# Patient Record
Sex: FEMALE | Race: WHITE | NOT HISPANIC OR LATINO | Employment: OTHER | URBAN - METROPOLITAN AREA
[De-identification: names, ages, dates, MRNs, and addresses within clinical notes are randomized per-mention and may not be internally consistent; named-entity substitution may affect disease eponyms.]

---

## 2017-02-16 RX ORDER — AMLODIPINE BESYLATE 10 MG/1
10 TABLET ORAL DAILY
Qty: 90 TABLET | Refills: 3 | Status: SHIPPED | OUTPATIENT
Start: 2017-02-16 | End: 2018-01-31 | Stop reason: SDUPTHER

## 2017-02-16 RX ORDER — HYDROCHLOROTHIAZIDE 12.5 MG/1
12.5 CAPSULE ORAL DAILY
Qty: 90 CAPSULE | Refills: 3 | Status: SHIPPED | OUTPATIENT
Start: 2017-02-16

## 2017-03-14 ENCOUNTER — DOCUMENTATION ONLY (OUTPATIENT)
Dept: FAMILY MEDICINE | Facility: CLINIC | Age: 82
End: 2017-03-14

## 2017-03-14 NOTE — PROGRESS NOTES
Pre-Visit Chart Review  For Appointment Scheduled on 3-15-17    Health Maintenance Due   Topic Date Due    TETANUS VACCINE  [birth year]18/1950

## 2017-03-15 ENCOUNTER — LAB VISIT (OUTPATIENT)
Dept: LAB | Facility: HOSPITAL | Age: 82
End: 2017-03-15
Attending: FAMILY MEDICINE
Payer: MEDICARE

## 2017-03-15 ENCOUNTER — OFFICE VISIT (OUTPATIENT)
Dept: FAMILY MEDICINE | Facility: CLINIC | Age: 82
End: 2017-03-15
Payer: MEDICARE

## 2017-03-15 VITALS
HEART RATE: 82 BPM | BODY MASS INDEX: 25.08 KG/M2 | WEIGHT: 141.56 LBS | SYSTOLIC BLOOD PRESSURE: 137 MMHG | DIASTOLIC BLOOD PRESSURE: 72 MMHG | TEMPERATURE: 98 F | HEIGHT: 63 IN

## 2017-03-15 DIAGNOSIS — E53.8 B12 NUTRITIONAL DEFICIENCY: ICD-10-CM

## 2017-03-15 DIAGNOSIS — I10 ESSENTIAL HYPERTENSION: Primary | ICD-10-CM

## 2017-03-15 DIAGNOSIS — Z11.1 SCREENING-PULMONARY TB: ICD-10-CM

## 2017-03-15 DIAGNOSIS — F03.90 DEMENTIA WITHOUT BEHAVIORAL DISTURBANCE, UNSPECIFIED DEMENTIA TYPE: ICD-10-CM

## 2017-03-15 DIAGNOSIS — I10 ESSENTIAL HYPERTENSION: ICD-10-CM

## 2017-03-15 LAB
ALBUMIN SERPL BCP-MCNC: 4.4 G/DL
ALP SERPL-CCNC: 70 U/L
ALT SERPL W/O P-5'-P-CCNC: 13 U/L
ANION GAP SERPL CALC-SCNC: 10 MMOL/L
AST SERPL-CCNC: 14 U/L
BASOPHILS # BLD AUTO: 0 K/UL
BASOPHILS NFR BLD: 0.5 %
BILIRUB SERPL-MCNC: 0.4 MG/DL
BUN SERPL-MCNC: 17 MG/DL
CALCIUM SERPL-MCNC: 10 MG/DL
CHLORIDE SERPL-SCNC: 95 MMOL/L
CO2 SERPL-SCNC: 26 MMOL/L
CREAT SERPL-MCNC: 1.1 MG/DL
DIFFERENTIAL METHOD: ABNORMAL
EOSINOPHIL # BLD AUTO: 0.1 K/UL
EOSINOPHIL NFR BLD: 1.5 %
ERYTHROCYTE [DISTWIDTH] IN BLOOD BY AUTOMATED COUNT: 13.6 %
EST. GFR  (AFRICAN AMERICAN): 53 ML/MIN/1.73 M^2
EST. GFR  (NON AFRICAN AMERICAN): 46 ML/MIN/1.73 M^2
GLUCOSE SERPL-MCNC: 119 MG/DL
HCT VFR BLD AUTO: 41.8 %
HGB BLD-MCNC: 14 G/DL
LYMPHOCYTES # BLD AUTO: 1.4 K/UL
LYMPHOCYTES NFR BLD: 17.2 %
MCH RBC QN AUTO: 29.7 PG
MCHC RBC AUTO-ENTMCNC: 33.4 %
MCV RBC AUTO: 89 FL
MONOCYTES # BLD AUTO: 0.7 K/UL
MONOCYTES NFR BLD: 9 %
NEUTROPHILS # BLD AUTO: 5.8 K/UL
NEUTROPHILS NFR BLD: 71.8 %
PLATELET # BLD AUTO: 286 K/UL
PMV BLD AUTO: 7.6 FL
POTASSIUM SERPL-SCNC: 3.7 MMOL/L
PROT SERPL-MCNC: 7.8 G/DL
RBC # BLD AUTO: 4.7 M/UL
SODIUM SERPL-SCNC: 131 MMOL/L
VIT B12 SERPL-MCNC: 1035 PG/ML
WBC # BLD AUTO: 8 K/UL

## 2017-03-15 PROCEDURE — 99999 PR PBB SHADOW E&M-EST. PATIENT-LVL III: CPT | Mod: PBBFAC,,, | Performed by: FAMILY MEDICINE

## 2017-03-15 PROCEDURE — 85025 COMPLETE CBC W/AUTO DIFF WBC: CPT

## 2017-03-15 PROCEDURE — 82607 VITAMIN B-12: CPT

## 2017-03-15 PROCEDURE — 99213 OFFICE O/P EST LOW 20 MIN: CPT | Mod: PBBFAC,PO | Performed by: FAMILY MEDICINE

## 2017-03-15 PROCEDURE — 99214 OFFICE O/P EST MOD 30 MIN: CPT | Mod: S$PBB,,, | Performed by: FAMILY MEDICINE

## 2017-03-15 PROCEDURE — 80053 COMPREHEN METABOLIC PANEL: CPT

## 2017-03-15 NOTE — PROGRESS NOTES
Subjective:       Patient ID: Tiffany Rivers is a 85 y.o. female.    Chief Complaint: Establish Care    Patient Active Problem List   Diagnosis    Hyperlipidemia LDL goal <130    Dementia    Menopause    B12 nutritional deficiency    Subcortical microvascular ischemic occlusive disease    Essential hypertension   Patient is here to establish care.  She is brought in by her granddaughter , her primary caregiver.  She lives in the [facility]. She has dementia but has no behavioral issues.  She is fairly independent requiring assistance with housekeeping, meds mgmt and bathing.  She denies problems today.  She ambulates independently without assistance and has had no falls in last year.  BP records reviewed and all wnl per AL care check    HPI  Review of Systems   Constitutional: Negative for fatigue and unexpected weight change.   Respiratory: Negative for chest tightness and shortness of breath.    Cardiovascular: Negative for chest pain, palpitations and leg swelling.   Gastrointestinal: Negative for abdominal pain.   Musculoskeletal: Negative for arthralgias.   Neurological: Negative for dizziness, syncope, light-headedness and headaches.   Psychiatric/Behavioral: Negative for dysphoric mood and sleep disturbance. The patient is not nervous/anxious.        Objective:      Physical Exam   Constitutional: She is oriented to person, place, and time. She appears well-developed and well-nourished.   Cardiovascular: Normal rate, regular rhythm and normal heart sounds.    Pulmonary/Chest: Effort normal and breath sounds normal.   Musculoskeletal: She exhibits no edema.   Ambulates well without assistance and able to get on and off exam table well   Neurological: She is alert and oriented to person, place, and time.   Skin: Skin is warm and dry.   Psychiatric: She has a normal mood and affect.   Nursing note and vitals reviewed.      Assessment:       1. Essential hypertension    2.  Screening-pulmonary TB    3. B12 nutritional deficiency    4. Dementia without behavioral disturbance, unspecified dementia type        Plan:         1. Essential hypertension  Controlled on current medications.  Continue current medications.    - CBC auto differential; Future  - Comprehensive metabolic panel; Future    2. Screening-pulmonary TB  Screen and treat as indicated:    - QUANTIFERON GOLD TB; Future    3. B12 nutritional deficiency  Screen and treat as indicated:    - Vitamin B12; Future    4. Dementia without behavioral disturbance, unspecified dementia type  Has tried dementia meds with neurology but did not tolerate them. No behavioral disturbance.  Cont support at AL    PCM goal documentation:  Patient readiness: acceptance and barriers:readiness  During the course of the visit the patient was educated and counseled about the following: Hypertension:   Dietary sodium restriction.  Regular aerobic exercise.  Goals: Hypertension: Reduce Blood Pressure  Goal/Outcomes met:Hypertension  The following self management tools provided:none  Patient Instructions (the written plan) was given to the patient/family: Yes  Time spent with patient: 40 minutes    Patient with be reevaluated in 6 months or sooner prn    Greater than 50% of this visit was spent counseling as described in above documentation:Yes

## 2017-03-15 NOTE — MR AVS SNAPSHOT
[facility]  [address]  [place] [address]  Phone: [phone]  Fax: [phone]                  Tiffany Rivers   3/15/2017 1:20 PM   Office Visit    Description:  Female : 1932   Provider:  Imelda Rodriguez MD   Department:  [facility]           Reason for Visit     Establish Care           Diagnoses this Visit        Comments    Screening-pulmonary TB    -  Primary     Essential hypertension         B12 nutritional deficiency                To Do List           Future Appointments        Provider Department Dept Phone    3/15/2017 2:10 PM [facility] [phone]    3/15/2017 2:20 PM [facility] [phone]    9/15/2017 1:20 PM Imelda Rodriguez MD [facility] [phone]      Goals (5 Years of Data)     None      Follow-Up and Disposition     Return in about 6 months (around 9/15/2017).      [facility]     [facility] - 24/7 Assistance  Registered nurses in the [facility] provide clinical advisement, health education, appointment booking, and other advisory services.  Call for this free service at [phone].             Medications                Verify that the below list of medications is an accurate representation of the medications you are currently taking.  If none reported, the list may be blank. If incorrect, please contact your healthcare provider. Carry this list with you in case of emergency.           Current Medications     amlodipine (NORVASC) 10 MG tablet Take 1 tablet (10 mg total) by mouth once daily.    aspirin 81 MG Chew Take 81 mg by mouth once daily.    cloNIDine (CATAPRES) 0.1 MG tablet Take one tablet q 6 hr prn blood pressure systolic over 180 or diastolic over 100    cyanocobalamin (VITAMIN B-12) 100 MCG tablet Take 100 mcg by mouth once daily.    hydrochlorothiazide (MICROZIDE) 12.5 mg capsule Take 1  "capsule (12.5 mg total) by mouth once daily.    lisinopril (PRINIVIL,ZESTRIL) 40 MG tablet Take 1 tablet (40 mg total) by mouth 2 (two) times daily.           Clinical Reference Information           Your Vitals Were     BP Pulse Temp Height Weight BMI    137/72 (BP Location: Left arm, Patient Position: Sitting, BP Method: Automatic) 82 97.6 °F (36.4 °C) (Oral) 5' 3" (1.6 m) 64.2 kg (141 lb 8.6 oz) 25.07 kg/m2      Blood Pressure          Most Recent Value    BP  137/72      Allergies as of 3/15/2017     No Known Allergies      Immunizations Administered on Date of Encounter - 3/15/2017     None      Orders Placed During Today's Visit     Future Labs/Procedures Expected by Expires    CBC auto differential  3/15/2017 5/14/2018    Comprehensive metabolic panel  3/15/2017 5/14/2018    QUANTIFERON GOLD TB  3/15/2017 5/14/2018    Vitamin B12  3/15/2017 5/14/2018      [facility] Sign-Up     Activating your [facility] account is as easy as 1-2-3!     1) Visit [email or web address], select Sign Up Now, enter this activation code and your date of birth, then select Next.  [email or web address]  Expires: 4/29/2017  1:55 PM      2) Create a username and password to use when you visit [facility] in the future and select a security question in case you lose your password and select Next.    3) Enter your e-mail address and click Sign Up!    Additional Information  If you have questions, please e-mail [email or web address] or call [phone] to talk to our [facility] staff. Remember, [facility] is NOT to be used for urgent needs. For medical emergencies, dial 911.         Language Assistance Services     ATTENTION: Language assistance services are available, free of charge. Please call [phone].      ATENCIÓN: Si habla español, tiene a jason disposición servicios gratuitos de asistencia lingüística. Llame al [phone].     CHÚ Ý: N?u b?n nói Ti?ng Vi?t, có các d?ch v? h? tr? ngôn ng? mi?n phí gilberth cho b?n. G?i s? [phone].      "    [facility] complies with applicable Federal civil rights laws and does not discriminate on the basis of race, color, national origin, age, disability, or sex.

## 2017-03-19 ENCOUNTER — TELEPHONE (OUTPATIENT)
Dept: FAMILY MEDICINE | Facility: CLINIC | Age: 82
End: 2017-03-19

## 2017-05-11 DIAGNOSIS — I10 ESSENTIAL HYPERTENSION: ICD-10-CM

## 2017-05-12 RX ORDER — LISINOPRIL 40 MG/1
40 TABLET ORAL 2 TIMES DAILY
Qty: 60 TABLET | Refills: 11 | Status: SHIPPED | OUTPATIENT
Start: 2017-05-12 | End: 2017-07-27 | Stop reason: SDUPTHER

## 2017-06-08 ENCOUNTER — TELEPHONE (OUTPATIENT)
Dept: FAMILY MEDICINE | Facility: CLINIC | Age: 82
End: 2017-06-08

## 2017-06-08 NOTE — TELEPHONE ENCOUNTER
----- Message from Kae Hastings sent at 6/8/2017 10:52 AM CDT -----  Please call [facility] in reference to patient.  Call Snadra at [phone]

## 2017-06-08 NOTE — TELEPHONE ENCOUNTER
Sandra briggs, states that patient is moving to [facility]. Had a physical in March. Center paid for physical and TB test at [facility]. Wants to see if you will fill out sections that physician could not (since he does not know her). Will fax over paperwork.

## 2017-06-09 ENCOUNTER — TELEPHONE (OUTPATIENT)
Dept: FAMILY MEDICINE | Facility: CLINIC | Age: 82
End: 2017-06-09

## 2017-06-09 NOTE — TELEPHONE ENCOUNTER
I can complete the paperwork without an additional appointment.  Drop it off to attn: Dr. Rodriguez staff

## 2017-06-09 NOTE — TELEPHONE ENCOUNTER
----- Message from Yuliet Lopez LPN sent at 6/5/2017  9:56 AM CDT -----  Contact: Daughter Carmen Pelaez [phone]      ----- Message -----  From: María Parker  Sent: 6/5/2017   9:16 AM  To: Jennifer MORELOS Staff    She has to move her mom to the memory care area of the assisted living facility, they are telling Carmen that she needs another physical and TB test (within 30 days of admittance to the unit).  She was seen in March for these.  Should she be seen for these or will you complete the paperwork.  Please call her.  Thank you!

## 2017-07-27 DIAGNOSIS — I10 ESSENTIAL HYPERTENSION: ICD-10-CM

## 2017-07-27 RX ORDER — LISINOPRIL 40 MG/1
40 TABLET ORAL 2 TIMES DAILY
Qty: 180 TABLET | Refills: 3 | Status: SHIPPED | OUTPATIENT
Start: 2017-07-27

## 2017-08-03 ENCOUNTER — TELEPHONE (OUTPATIENT)
Dept: FAMILY MEDICINE | Facility: CLINIC | Age: 82
End: 2017-08-03

## 2017-08-03 NOTE — TELEPHONE ENCOUNTER
Received fax from the nurse at [facility] reporting that Ms Rivers is resistant to assistance with ADL's and is combative. Requesting PRN order for anxiety for the patient. Please advise.

## 2017-09-11 DIAGNOSIS — Z78.0 ASYMPTOMATIC MENOPAUSAL STATE: Primary | ICD-10-CM

## 2017-09-12 ENCOUNTER — DOCUMENTATION ONLY (OUTPATIENT)
Dept: FAMILY MEDICINE | Facility: CLINIC | Age: 82
End: 2017-09-12

## 2017-09-12 NOTE — PROGRESS NOTES
Pre-Visit Chart Review  For Appointment Scheduled on 09/14/2017    Health Maintenance Due   Topic Date Due    TETANUS VACCINE  [birth year]18/1950    DEXA SCAN  02/18/1972    Zoster Vaccine  02/18/1992    Pneumococcal (65+) (2 of 2 - PCV13) 10/23/2014    Influenza Vaccine  08/01/2017

## 2017-09-14 ENCOUNTER — LAB VISIT (OUTPATIENT)
Dept: LAB | Facility: HOSPITAL | Age: 82
End: 2017-09-14
Attending: PHYSICIAN ASSISTANT
Payer: MEDICARE

## 2017-09-14 ENCOUNTER — OFFICE VISIT (OUTPATIENT)
Dept: FAMILY MEDICINE | Facility: CLINIC | Age: 82
End: 2017-09-14
Payer: MEDICARE

## 2017-09-14 VITALS
WEIGHT: 136.25 LBS | BODY MASS INDEX: 24.14 KG/M2 | DIASTOLIC BLOOD PRESSURE: 70 MMHG | HEART RATE: 82 BPM | TEMPERATURE: 98 F | HEIGHT: 63 IN | SYSTOLIC BLOOD PRESSURE: 140 MMHG

## 2017-09-14 DIAGNOSIS — F03.91 DEMENTIA WITH BEHAVIORAL DISTURBANCE, UNSPECIFIED DEMENTIA TYPE: Primary | ICD-10-CM

## 2017-09-14 DIAGNOSIS — I10 ESSENTIAL HYPERTENSION: ICD-10-CM

## 2017-09-14 LAB
ALBUMIN SERPL BCP-MCNC: 4.1 G/DL
ALP SERPL-CCNC: 88 U/L
ALT SERPL W/O P-5'-P-CCNC: 11 U/L
ANION GAP SERPL CALC-SCNC: 10 MMOL/L
AST SERPL-CCNC: 14 U/L
BILIRUB SERPL-MCNC: 0.3 MG/DL
BUN SERPL-MCNC: 14 MG/DL
CALCIUM SERPL-MCNC: 9.7 MG/DL
CHLORIDE SERPL-SCNC: 97 MMOL/L
CO2 SERPL-SCNC: 28 MMOL/L
CREAT SERPL-MCNC: 0.9 MG/DL
EST. GFR  (AFRICAN AMERICAN): >60 ML/MIN/1.73 M^2
EST. GFR  (NON AFRICAN AMERICAN): 58.5 ML/MIN/1.73 M^2
GLUCOSE SERPL-MCNC: 104 MG/DL
POTASSIUM SERPL-SCNC: 3.7 MMOL/L
PROT SERPL-MCNC: 7.8 G/DL
SODIUM SERPL-SCNC: 135 MMOL/L

## 2017-09-14 PROCEDURE — 3077F SYST BP >= 140 MM HG: CPT | Mod: ,,, | Performed by: PHYSICIAN ASSISTANT

## 2017-09-14 PROCEDURE — 36415 COLL VENOUS BLD VENIPUNCTURE: CPT | Mod: PO

## 2017-09-14 PROCEDURE — 99214 OFFICE O/P EST MOD 30 MIN: CPT | Mod: S$PBB,,, | Performed by: PHYSICIAN ASSISTANT

## 2017-09-14 PROCEDURE — 80053 COMPREHEN METABOLIC PANEL: CPT

## 2017-09-14 PROCEDURE — 3078F DIAST BP <80 MM HG: CPT | Mod: ,,, | Performed by: PHYSICIAN ASSISTANT

## 2017-09-14 PROCEDURE — 99214 OFFICE O/P EST MOD 30 MIN: CPT | Mod: PBBFAC,PO | Performed by: PHYSICIAN ASSISTANT

## 2017-09-14 PROCEDURE — 99999 PR PBB SHADOW E&M-EST. PATIENT-LVL IV: CPT | Mod: PBBFAC,,, | Performed by: PHYSICIAN ASSISTANT

## 2017-09-14 PROCEDURE — 1126F AMNT PAIN NOTED NONE PRSNT: CPT | Mod: ,,, | Performed by: PHYSICIAN ASSISTANT

## 2017-09-14 PROCEDURE — 1159F MED LIST DOCD IN RCRD: CPT | Mod: ,,, | Performed by: PHYSICIAN ASSISTANT

## 2017-09-14 NOTE — TELEPHONE ENCOUNTER
Dr. Rodriguez,    The patient is bathing 3 nights per week with assistance. No symptoms of depression/anxiety. Patient's family understands pros/cons of ativan. I have pended the RX.  Ativan 1 mg PO 1 hour prior to bath nightly. No more than 1 tablet per day. Dispense # 15    Flavia WASHINGTON
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
## 2017-09-14 NOTE — PATIENT INSTRUCTIONS
Controlling High Blood Pressure  High blood pressure (hypertension) is often called the silent killer. This is because many people who have it dont know it. High blood pressure is defined as 140/90 mm Hg or higher. Know your blood pressure and remember to check it regularly. Doing so can save your life. Here are some things you can do to help control your blood pressure.    Choose heart-healthy foods  · Select low-salt, low-fat foods. Limit sodium intake to 2,400 mg per day or the amount suggested by your healthcare provider.  · Limit canned, dried, cured, packaged, and fast foods. These can contain a lot of salt.  · Eat 8 to 10 servings of fruits and vegetables every day.  · Choose lean meats, fish, or chicken.  · Eat whole-grain pasta, brown rice, and beans.  · Eat 2 to 3 servings of low-fat or fat-free dairy products.  · Ask your doctor about the DASH eating plan. This plan helps reduce blood pressure.  · When you go to a restaurant, ask that your meal be prepared with no added salt.  Maintain a healthy weight  · Ask your healthcare provider how many calories to eat a day. Then stick to that number.  · Ask your healthcare provider what weight range is healthiest for you. If you are overweight, a weight loss of only 3% to 5% of your body weight can help lower blood pressure. Generally, a good weight loss goal is to lose 10% of your body weight in a year.  · Limit snacks and sweets.  · Get regular exercise.  Get up and get active  · Choose activities you enjoy. Find ones you can do with friends or family. This includes bicycling, dancing, walking, and jogging.  · Park farther away from building entrances.  · Use stairs instead of the elevator.  · When you can, walk or bike instead of driving.  · Irving leaves, garden, or do household repairs.  · Be active at a moderate to vigorous level of physical activity for at least 40 minutes for a minimum of 3 to 4 days a week.   Manage stress  · Make time to relax and enjoy  life. Find time to laugh.  · Communicate your concerns with your loved ones and your healthcare provider.  · Visit with family and friends, and keep up with hobbies.  Limit alcohol and quit smoking  · Men should have no more than 2 drinks per day.  · Women should have no more than 1 drink per day.  · Talk with your healthcare provider about quitting smoking. Smoking significantly increases your risk for heart disease and stroke. Ask your healthcare provider about community smoking cessation programs and other options.  Medicines  If lifestyle changes arent enough, your healthcare provider may prescribe high blood pressure medicine. Take all medicines as prescribed. If you have any questions about your medicines, ask your healthcare provider before stopping or changing them.   Date Last Reviewed: 4/27/2016  © 0578-2950 [employer]. [address] [place] [address], [place] [address]. All rights reserved. This information is not intended as a substitute for professional medical care. Always follow your healthcare professional's instructions.

## 2017-09-18 RX ORDER — LORAZEPAM 1 MG/1
TABLET ORAL
Qty: 15 TABLET | Refills: 0 | Status: SHIPPED | OUTPATIENT
Start: 2017-09-18 | End: 2017-09-21 | Stop reason: SDUPTHER

## 2017-09-21 DIAGNOSIS — F03.91 DEMENTIA WITH BEHAVIORAL DISTURBANCE, UNSPECIFIED DEMENTIA TYPE: ICD-10-CM

## 2017-09-21 DIAGNOSIS — R45.1 AGITATION: Primary | ICD-10-CM

## 2017-09-21 RX ORDER — LORAZEPAM 1 MG/1
1 TABLET ORAL DAILY PRN
Qty: 15 TABLET | Refills: 0 | Status: SHIPPED | OUTPATIENT
Start: 2017-09-21 | End: 2017-10-04 | Stop reason: SDUPTHER

## 2017-09-21 NOTE — PROGRESS NOTES
In response to comments from [facility] (notify them): Obsessions, confusions, wandering, agitation are all typical symptoms of dementia.  She is in the memory care section of [facility].  Staff should be trained on redirection and deescalation, reorienting strategies.  I did not get an impression from family member that day to day behavior was severe or dangerous except when resisting bathing.  Patient should not be forced to bathe daily.  Every other days should suffice unless visibly soiled.  If behavior is prohibitive to placement or she is a danger to herself or others then no further medical interventions are needed at this time other than a lorazepam 1 hour prior to a bath as needed.

## 2017-09-25 ENCOUNTER — TELEPHONE (OUTPATIENT)
Dept: FAMILY MEDICINE | Facility: CLINIC | Age: 82
End: 2017-09-25

## 2017-09-25 NOTE — TELEPHONE ENCOUNTER
Please make appt to assess the patient before x-ray.  May use urgent care or [facility] if needed-try to get seen within 24 hours

## 2017-09-25 NOTE — TELEPHONE ENCOUNTER
----- Message from Laura Reece sent at 9/25/2017  1:20 PM CDT -----  Contact: Sam with [facility]   Sam with [facility] called to report that the patient has a fall and is complaining of coccyx pain  She would like an order for a portal xray   Please call her at  to advise.    Thanks

## 2017-09-27 ENCOUNTER — HOSPITAL ENCOUNTER (INPATIENT)
Facility: HOSPITAL | Age: 82
LOS: 1 days | Discharge: NURSING, REHAB OR LONG-TERM CARE | DRG: 313 | End: 2017-09-29
Attending: EMERGENCY MEDICINE | Admitting: HOSPITALIST
Payer: MEDICARE

## 2017-09-27 DIAGNOSIS — R07.9 CHEST PAIN, UNSPECIFIED TYPE: Primary | ICD-10-CM

## 2017-09-27 DIAGNOSIS — R07.9 CHEST PAIN: ICD-10-CM

## 2017-09-27 LAB
ANION GAP SERPL CALC-SCNC: 11 MMOL/L
BASOPHILS # BLD AUTO: 0 K/UL
BASOPHILS NFR BLD: 0.1 %
BUN SERPL-MCNC: 15 MG/DL
CALCIUM SERPL-MCNC: 10.3 MG/DL
CHLORIDE SERPL-SCNC: 94 MMOL/L
CO2 SERPL-SCNC: 26 MMOL/L
CREAT SERPL-MCNC: 0.8 MG/DL
D DIMER PPP IA.FEU-MCNC: 2.78 MG/L FEU
DIFFERENTIAL METHOD: ABNORMAL
EOSINOPHIL # BLD AUTO: 0.1 K/UL
EOSINOPHIL NFR BLD: 1.4 %
ERYTHROCYTE [DISTWIDTH] IN BLOOD BY AUTOMATED COUNT: 12.9 %
EST. GFR  (AFRICAN AMERICAN): >60 ML/MIN/1.73 M^2
EST. GFR  (NON AFRICAN AMERICAN): >60 ML/MIN/1.73 M^2
GLUCOSE SERPL-MCNC: 117 MG/DL
HCT VFR BLD AUTO: 43.2 %
HGB BLD-MCNC: 14.4 G/DL
LYMPHOCYTES # BLD AUTO: 1.2 K/UL
LYMPHOCYTES NFR BLD: 12 %
MCH RBC QN AUTO: 30 PG
MCHC RBC AUTO-ENTMCNC: 33.3 G/DL
MCV RBC AUTO: 90 FL
MONOCYTES # BLD AUTO: 0.7 K/UL
MONOCYTES NFR BLD: 6.9 %
NEUTROPHILS # BLD AUTO: 8.1 K/UL
NEUTROPHILS NFR BLD: 79.6 %
PLATELET # BLD AUTO: 302 K/UL
PMV BLD AUTO: 7.4 FL
POTASSIUM SERPL-SCNC: 3.5 MMOL/L
RBC # BLD AUTO: 4.8 M/UL
SODIUM SERPL-SCNC: 131 MMOL/L
TROPONIN I SERPL DL<=0.01 NG/ML-MCNC: <0.006 NG/ML
WBC # BLD AUTO: 10.2 K/UL

## 2017-09-27 PROCEDURE — 36415 COLL VENOUS BLD VENIPUNCTURE: CPT

## 2017-09-27 PROCEDURE — 99285 EMERGENCY DEPT VISIT HI MDM: CPT | Mod: 25

## 2017-09-27 PROCEDURE — 25000003 PHARM REV CODE 250: Performed by: EMERGENCY MEDICINE

## 2017-09-27 PROCEDURE — 96360 HYDRATION IV INFUSION INIT: CPT

## 2017-09-27 PROCEDURE — 80048 BASIC METABOLIC PNL TOTAL CA: CPT

## 2017-09-27 PROCEDURE — 93005 ELECTROCARDIOGRAM TRACING: CPT

## 2017-09-27 PROCEDURE — G0378 HOSPITAL OBSERVATION PER HR: HCPCS

## 2017-09-27 PROCEDURE — 84484 ASSAY OF TROPONIN QUANT: CPT

## 2017-09-27 PROCEDURE — 96361 HYDRATE IV INFUSION ADD-ON: CPT

## 2017-09-27 PROCEDURE — 85379 FIBRIN DEGRADATION QUANT: CPT

## 2017-09-27 PROCEDURE — 25500020 PHARM REV CODE 255

## 2017-09-27 PROCEDURE — 85025 COMPLETE CBC W/AUTO DIFF WBC: CPT

## 2017-09-27 RX ADMIN — IOHEXOL 64 ML: 350 INJECTION, SOLUTION INTRAVENOUS at 08:09

## 2017-09-27 RX ADMIN — SODIUM CHLORIDE 500 ML: 900 INJECTION, SOLUTION INTRAVENOUS at 07:09

## 2017-09-27 NOTE — ED PROVIDER NOTES
Encounter Date: 9/27/2017    SCRIBE #1 NOTE: I, Haleigh Torres , am scribing for, and in the presence of,  Dr. Roe . I have scribed the entire note.       History     Chief Complaint   Patient presents with    Chest Pain       09/27/2017  6:43 PM     Chief Complaint: CP      The patient is a 85 y.o. female who is presenting per EMS from [facility] for the acute onset of CP that began just PTA and has since resolved. Per EMS, the pt c/o of mid sternal CP that was able to resolved with an anti-acid. No other comments or complaints. The pt currently denies being in any distress. No recent SOB, diaphoresis, or emesis. Pertinent PMHx includes HTN and dementia. No pertinent past surgical hx.                 The history is provided by the patient and medical records.     Review of patient's allergies indicates:  No Known Allergies  Past Medical History:   Diagnosis Date    Hypertension      Past Surgical History:   Procedure Laterality Date    CATARACT EXTRACTION W/  INTRAOCULAR LENS IMPLANT      COLONOSCOPY  1999    normal per patient.      HYSTERECTOMY       Family History   Problem Relation Age of Onset    Diabetes Mother     Heart disease Father     Cancer Neg Hx      Social History   Substance Use Topics    Smoking status: Never Smoker    Smokeless tobacco: Never Used    Alcohol use No     Review of Systems   Constitutional: Negative for fever.   HENT: Negative for sore throat.    Eyes: Negative for visual disturbance.   Respiratory: Negative for shortness of breath.    Cardiovascular: Positive for chest pain (since resolved ).   Gastrointestinal: Negative for nausea.   Genitourinary: Negative for dysuria.   Musculoskeletal: Negative for back pain.   Skin: Negative for rash.   Neurological: Negative for weakness.   Hematological: Does not bruise/bleed easily.   Psychiatric/Behavioral: Negative for confusion.       Physical Exam     Initial Vitals   BP Pulse Resp Temp SpO2   -- --  -- -- --      MAP       --         Physical Exam    Nursing note and vitals reviewed.  Constitutional: She appears well-developed and well-nourished.   HENT:   Head: Normocephalic and atraumatic.   Mouth/Throat: Oropharynx is clear and moist. No oropharyngeal exudate.   Eyes: EOM are normal. Pupils are equal, round, and reactive to light.   Neck: Normal range of motion. Neck supple.   Cardiovascular: Regular rhythm, normal heart sounds and intact distal pulses. Exam reveals no gallop and no friction rub.    No murmur heard.  Tachycardic rate   Pulmonary/Chest: Breath sounds normal. She has no wheezes. She has no rhonchi. She has no rales. She exhibits no tenderness.   Abdominal: Soft. She exhibits no distension. There is no tenderness.   Lymphadenopathy:     She has no cervical adenopathy.   Neurological: She is alert. She has normal strength. A sensory deficit is present.   Baseline mental status    Skin: Skin is warm and dry. Capillary refill takes less than 2 seconds.         ED Course   Procedures  Labs Reviewed   CBC W/ AUTO DIFFERENTIAL - Abnormal; Notable for the following:        Result Value    MPV 7.4 (*)     Gran # 8.1 (*)     Gran% 79.6 (*)     Lymph% 12.0 (*)     All other components within normal limits   BASIC METABOLIC PANEL - Abnormal; Notable for the following:     Sodium 131 (*)     Chloride 94 (*)     Glucose 117 (*)     All other components within normal limits   D DIMER, QUANTITATIVE - Abnormal; Notable for the following:     D-Dimer 2.78 (*)     All other components within normal limits   TROPONIN I                        Scribe Attestation:   Scribe #1: I performed the above scribed service and the documentation accurately describes the services I performed. I attest to the accuracy of the note.    Attending Attestation:           Physician Attestation for Scribe:  Physician Attestation Statement for Scribe #1: I, Dr. Roe , reviewed documentation, as scribed by Haleigh Torres  in my  presence, and it is both accurate and complete.         Tiffany Rivers is a 85 y.o. female presenting with episode of chest pain.  Factors uncertain as patient is a very poor historian with poor recall.  She appears comfortable here.  Initial workup is negative.  Positive d-dimer with negative CT angiogram for PE.  Low suspicion for dissection.  I did discuss this extensively with family wished the patient admitted for further monitoring.  I feel this is reasonable given advanced age and limited available history.  She is comfortable at present.  I will admit to the hospital service for serial troponin and evaluation and further cardiac monitoring.        ED Course as of Sep 27 2227   Wed Sep 27, 2017   1910 EKG:  Sinus tachycardia, rate of 101, normal intervals and axis.  There are no acute ST or T wave changes suggestive of acute ischemia or infarction.  [MR]   1939 CXR:  Atelectasis L lung base, NAD. (my read)  [MR]   2103 CT-Chest:  1. There is bibasilar atelectatic change or scarring. Cannot exclude a focus of dense pneumonitis in  the left lower lobe base.  2. There is a moderate hiatal hernia.  3. No visible acute pulmonary embolism.  (rad read)  [MR]      ED Course User Index  [MR] Bonifacio Roe MD     Clinical Impression:   The encounter diagnosis was Chest pain, unspecified type.                           Bonifacio Roe MD  09/27/17 2228

## 2017-09-28 PROBLEM — K59.00 CONSTIPATION: Status: ACTIVE | Noted: 2017-09-28

## 2017-09-28 LAB
CHOLEST SERPL-MCNC: 193 MG/DL
CHOLEST/HDLC SERPL: 3.4 {RATIO}
HDLC SERPL-MCNC: 56 MG/DL
HDLC SERPL: 29 %
LDLC SERPL CALC-MCNC: 127.2 MG/DL
NONHDLC SERPL-MCNC: 137 MG/DL
TRIGL SERPL-MCNC: 49 MG/DL
TROPONIN I SERPL DL<=0.01 NG/ML-MCNC: 0.01 NG/ML
TROPONIN I SERPL DL<=0.01 NG/ML-MCNC: 0.03 NG/ML

## 2017-09-28 PROCEDURE — 93005 ELECTROCARDIOGRAM TRACING: CPT

## 2017-09-28 PROCEDURE — 25000003 PHARM REV CODE 250: Performed by: NURSE PRACTITIONER

## 2017-09-28 PROCEDURE — 63600175 PHARM REV CODE 636 W HCPCS: Performed by: HOSPITALIST

## 2017-09-28 PROCEDURE — 84484 ASSAY OF TROPONIN QUANT: CPT

## 2017-09-28 PROCEDURE — 63600175 PHARM REV CODE 636 W HCPCS

## 2017-09-28 PROCEDURE — 80061 LIPID PANEL: CPT

## 2017-09-28 PROCEDURE — 96372 THER/PROPH/DIAG INJ SC/IM: CPT

## 2017-09-28 PROCEDURE — 36415 COLL VENOUS BLD VENIPUNCTURE: CPT

## 2017-09-28 PROCEDURE — 96366 THER/PROPH/DIAG IV INF ADDON: CPT

## 2017-09-28 PROCEDURE — 11000001 HC ACUTE MED/SURG PRIVATE ROOM

## 2017-09-28 RX ORDER — ZIPRASIDONE MESYLATE 20 MG/ML
5 INJECTION, POWDER, LYOPHILIZED, FOR SOLUTION INTRAMUSCULAR ONCE
Status: COMPLETED | OUTPATIENT
Start: 2017-09-28 | End: 2017-09-28

## 2017-09-28 RX ORDER — LISINOPRIL 40 MG/1
40 TABLET ORAL 2 TIMES DAILY
Status: DISCONTINUED | OUTPATIENT
Start: 2017-09-28 | End: 2017-09-29 | Stop reason: HOSPADM

## 2017-09-28 RX ORDER — NAPROXEN SODIUM 220 MG/1
81 TABLET, FILM COATED ORAL DAILY
Status: DISCONTINUED | OUTPATIENT
Start: 2017-09-28 | End: 2017-09-29 | Stop reason: HOSPADM

## 2017-09-28 RX ORDER — SODIUM CHLORIDE 9 MG/ML
INJECTION, SOLUTION INTRAVENOUS CONTINUOUS
Status: DISCONTINUED | OUTPATIENT
Start: 2017-09-28 | End: 2017-09-28

## 2017-09-28 RX ORDER — ACETAMINOPHEN 325 MG/1
650 TABLET ORAL EVERY 6 HOURS PRN
Status: DISCONTINUED | OUTPATIENT
Start: 2017-09-28 | End: 2017-09-29 | Stop reason: HOSPADM

## 2017-09-28 RX ORDER — BISACODYL 10 MG
10 SUPPOSITORY, RECTAL RECTAL ONCE
Status: COMPLETED | OUTPATIENT
Start: 2017-09-28 | End: 2017-09-28

## 2017-09-28 RX ORDER — ATROPINE SULFATE 0.1 MG/ML
INJECTION INTRAVENOUS
Status: DISCONTINUED
Start: 2017-09-28 | End: 2017-09-28 | Stop reason: WASHOUT

## 2017-09-28 RX ORDER — CLONIDINE HYDROCHLORIDE 0.1 MG/1
0.1 TABLET ORAL EVERY 6 HOURS PRN
Status: DISCONTINUED | OUTPATIENT
Start: 2017-09-28 | End: 2017-09-29 | Stop reason: HOSPADM

## 2017-09-28 RX ORDER — POLYETHYLENE GLYCOL 3350 17 G/17G
17 POWDER, FOR SOLUTION ORAL DAILY
Status: DISCONTINUED | OUTPATIENT
Start: 2017-09-28 | End: 2017-09-29 | Stop reason: HOSPADM

## 2017-09-28 RX ORDER — DOBUTAMINE HYDROCHLORIDE 200 MG/100ML
50 INJECTION INTRAVENOUS ONCE
Status: DISCONTINUED | OUTPATIENT
Start: 2017-09-28 | End: 2017-09-28

## 2017-09-28 RX ORDER — PANTOPRAZOLE SODIUM 40 MG/1
40 TABLET, DELAYED RELEASE ORAL DAILY
Status: DISCONTINUED | OUTPATIENT
Start: 2017-09-28 | End: 2017-09-29 | Stop reason: HOSPADM

## 2017-09-28 RX ORDER — ONDANSETRON 2 MG/ML
4 INJECTION INTRAMUSCULAR; INTRAVENOUS EVERY 6 HOURS PRN
Status: DISCONTINUED | OUTPATIENT
Start: 2017-09-28 | End: 2017-09-29 | Stop reason: HOSPADM

## 2017-09-28 RX ORDER — MAG HYDROX/ALUMINUM HYD/SIMETH 200-200-20
30 SUSPENSION, ORAL (FINAL DOSE FORM) ORAL EVERY 6 HOURS PRN
Status: DISCONTINUED | OUTPATIENT
Start: 2017-09-28 | End: 2017-09-29 | Stop reason: HOSPADM

## 2017-09-28 RX ORDER — OLANZAPINE 10 MG/2ML
2.5 INJECTION, POWDER, FOR SOLUTION INTRAMUSCULAR ONCE AS NEEDED
Status: DISPENSED | OUTPATIENT
Start: 2017-09-28 | End: 2017-09-28

## 2017-09-28 RX ORDER — LORAZEPAM 1 MG/1
1 TABLET ORAL DAILY PRN
Status: DISCONTINUED | OUTPATIENT
Start: 2017-09-28 | End: 2017-09-29 | Stop reason: HOSPADM

## 2017-09-28 RX ORDER — PNV NO.95/FERROUS FUM/FOLIC AC 28MG-0.8MG
100 TABLET ORAL DAILY
Status: DISCONTINUED | OUTPATIENT
Start: 2017-09-28 | End: 2017-09-29 | Stop reason: HOSPADM

## 2017-09-28 RX ORDER — HYDROCHLOROTHIAZIDE 12.5 MG/1
12.5 TABLET ORAL DAILY
Status: DISCONTINUED | OUTPATIENT
Start: 2017-09-28 | End: 2017-09-29 | Stop reason: HOSPADM

## 2017-09-28 RX ORDER — NITROGLYCERIN 0.4 MG/1
0.4 TABLET SUBLINGUAL EVERY 5 MIN PRN
Status: DISCONTINUED | OUTPATIENT
Start: 2017-09-28 | End: 2017-09-29 | Stop reason: HOSPADM

## 2017-09-28 RX ORDER — AMLODIPINE BESYLATE 5 MG/1
10 TABLET ORAL DAILY
Status: DISCONTINUED | OUTPATIENT
Start: 2017-09-28 | End: 2017-09-29 | Stop reason: HOSPADM

## 2017-09-28 RX ADMIN — VITAM B12 100 MCG: 100 TAB at 08:09

## 2017-09-28 RX ADMIN — ALUMINUM HYDROXIDE, MAGNESIUM HYDROXIDE, AND SIMETHICONE 30 ML: 200; 200; 20 SUSPENSION ORAL at 06:09

## 2017-09-28 RX ADMIN — ASPIRIN 81 MG CHEWABLE TABLET 81 MG: 81 TABLET CHEWABLE at 08:09

## 2017-09-28 RX ADMIN — BISACODYL 10 MG: 10 SUPPOSITORY RECTAL at 05:09

## 2017-09-28 RX ADMIN — LISINOPRIL 40 MG: 40 TABLET ORAL at 08:09

## 2017-09-28 RX ADMIN — LORAZEPAM 1 MG: 1 TABLET ORAL at 08:09

## 2017-09-28 RX ADMIN — AMLODIPINE BESYLATE 10 MG: 5 TABLET ORAL at 08:09

## 2017-09-28 RX ADMIN — LISINOPRIL 40 MG: 40 TABLET ORAL at 01:09

## 2017-09-28 RX ADMIN — HYDROCHLOROTHIAZIDE 12.5 MG: 12.5 TABLET ORAL at 08:09

## 2017-09-28 RX ADMIN — POLYETHYLENE GLYCOL (3350) 17 G: 17 POWDER, FOR SOLUTION ORAL at 08:09

## 2017-09-28 RX ADMIN — ACETAMINOPHEN 650 MG: 325 TABLET ORAL at 08:09

## 2017-09-28 RX ADMIN — ZIPRASIDONE MESYLATE 5 MG: 20 INJECTION, POWDER, LYOPHILIZED, FOR SOLUTION INTRAMUSCULAR at 11:09

## 2017-09-28 RX ADMIN — SODIUM CHLORIDE: 0.9 INJECTION, SOLUTION INTRAVENOUS at 01:09

## 2017-09-28 RX ADMIN — PANTOPRAZOLE SODIUM 40 MG: 40 TABLET, DELAYED RELEASE ORAL at 11:09

## 2017-09-28 RX ADMIN — ACETAMINOPHEN 650 MG: 325 TABLET ORAL at 06:09

## 2017-09-28 NOTE — ED NOTES
Pt resting comfortably, no needs voiced at this time, denies chest pain. NAD, will continue to monitor.

## 2017-09-28 NOTE — HOSPITAL COURSE
Tiffany Rivers is an 85 year old female with a PMH of HTN and Dementia from [facility]  admitted to [facility] on 9/27/2017 for chest pain. Patient had unremarkable EKG's. Cardiac enzymes negative with the exception of second troponin slightly elevated; CE's repeated x 2 were normal.  Although patient poor historian due to dementia her chest pain is atypical; relieved by antacid and MARINA score 2;  Patient did not tolerate initial attempt at stress test due to  agitation which is now resolved; Dobutamine stress echo done today was unremarkable; patient tolerated well.  Patient's vital signs remain stable.  D- Dimer elevated on admission; CTA negative. Patient had tachycardia and HTN intermittently throughout hospitalization; given IVF's and BB initiated.  Labs unremarkable today - see labs; PPI initiated. KUB revealed constipation; mirilax and dulcolax prescribed; patient's abdomen soft without tenderness or distention on exam; she has no complaints of pain; she states she suffers from chronic constipation.  Patient will be discharged to [facility] and follow up with PCP next week.

## 2017-09-28 NOTE — PROGRESS NOTES
09/28/17 0905   Patient Assessment/Suction   Level of Consciousness (AVPU) alert   PRE-TX-O2-ETCO2   O2 Device (Oxygen Therapy) room air   SpO2 (!) 94 %   Pulse Oximetry Type Intermittent   Pulse 88   Resp 18

## 2017-09-28 NOTE — ED NOTES
Granddaughter - Carmen - [phone]  [place] - Gerardo - [phone]  [place]- Carlin- [phone]  Please call Carmen first with any concerns/questions and when ready for discharge - travon will take patient back to home

## 2017-09-28 NOTE — SUBJECTIVE & OBJECTIVE
Interval History: patient reports intermittent epigastric pain throughout the night and day; she reports chronic SOB with activity only; she is somewhat of a poor historian due to baseline dementia.    Review of Systems   Constitutional: Negative for chills and fatigue.   HENT: Negative for sore throat.    Respiratory: Positive for shortness of breath (with activity chronically). Negative for cough, chest tightness and wheezing.    Cardiovascular: Positive for chest pain (intermittent mild epigastric). Negative for palpitations and leg swelling.   Gastrointestinal: Positive for constipation. Negative for abdominal distention, abdominal pain, diarrhea, nausea and vomiting.   Musculoskeletal: Negative for back pain.   Skin: Negative for wound.   Neurological: Negative for weakness.   Psychiatric/Behavioral: Positive for agitation, behavioral problems and confusion.     Objective:     Vital Signs (Most Recent):  Temp: 97.2 °F (36.2 °C) (09/28/17 1247)  Pulse: 104 (09/28/17 1247)  Resp: 18 (09/28/17 1247)  BP: (!) 143/69 (09/28/17 1247)  SpO2: 95 % (09/28/17 1247) Vital Signs (24h Range):  Temp:  [96.8 °F (36 °C)-98 °F (36.7 °C)] 97.2 °F (36.2 °C)  Pulse:  [] 104  Resp:  [16-18] 18  SpO2:  [94 %-98 %] 95 %  BP: (143-181)/(69-96) 143/69     Weight: 61.8 kg (136 lb 3.9 oz)  Body mass index is 26.61 kg/m².    Intake/Output Summary (Last 24 hours) at 09/28/17 1556  Last data filed at 09/28/17 1200   Gross per 24 hour   Intake             1170 ml   Output              625 ml   Net              545 ml      Physical Exam   Constitutional: She appears well-developed and well-nourished.   HENT:   Head: Normocephalic and atraumatic.   Mouth/Throat: Oropharynx is clear and moist.   Eyes: Conjunctivae and EOM are normal. Pupils are equal, round, and reactive to light.   Neck: Normal range of motion. Neck supple.   Cardiovascular: Normal rate and regular rhythm.    No murmur heard.  Pulmonary/Chest: Effort normal and breath  sounds normal. No respiratory distress. She has no wheezes.   Abdominal: Soft. Bowel sounds are normal. She exhibits no distension and no mass. There is no tenderness. There is no rebound and no guarding. No hernia.   Musculoskeletal: Normal range of motion.   Neurological: She is alert. She is disoriented.   Oriented to self only   Skin: Skin is warm. Capillary refill takes less than 2 seconds.   Psychiatric: She has a normal mood and affect. Thought content normal. She exhibits abnormal recent memory.   Patient oriented x 1 on exam but answers questions regarding pain and symptoms with some difficulty recalling frequency and characteristics of symptoms; nurse later reports patient agitation and pulling out IV's and monitor and uncooperative for stress test.    Nursing note and vitals reviewed.      Significant Labs:   CBC:   Recent Labs  Lab 09/27/17 1915   WBC 10.20   HGB 14.4   HCT 43.2        CMP:   Recent Labs  Lab 09/27/17 1915   *   K 3.5   CL 94*   CO2 26   *   BUN 15   CREATININE 0.8   CALCIUM 10.3   ANIONGAP 11   EGFRNONAA >60     Troponin:   Recent Labs  Lab 09/27/17 1915 09/28/17  0128 09/28/17  0551   TROPONINI <0.006 0.028* 0.014       Significant Imaging:

## 2017-09-28 NOTE — PROGRESS NOTES
[facility] Hospital Medicine  Progress Note    Patient Name: Tiffany Rivers  MRN: [id number]  Patient Class: OP- Observation   Admission Date: 9/27/2017  Length of Stay: 0 days  Attending Physician: Gopi Farley MD  Primary Care Provider: Imelda Rodriguez MD        Subjective:     Principal Problem:Chest pain    HPI:  Ms. Rivers is an 86yo F with a PMH of HTN and Dementia. She resides at a [facility] and was sent to the ED when she developed CP. Patient is a poor historian and information is obtained from ED records. Per EMS, the pt c/o of mid sternal CP that was  resolved with an anti-acid. While in the ED, her D-Dimer was elevated and a CTA chest was done, which was (-) for a PE. Her troponin was also negative. She was bolused with 500cc NS for tachycardia. She currently denies pain.    Hospital Course:  Stress test delayed due to patient uncooperative.      Interval History: patient reports intermittent epigastric pain throughout the night and day; she reports chronic SOB with activity only; she is somewhat of a poor historian due to baseline dementia.    Review of Systems   Constitutional: Negative for chills and fatigue.   HENT: Negative for sore throat.    Respiratory: Positive for shortness of breath (with activity chronically). Negative for cough, chest tightness and wheezing.    Cardiovascular: Positive for chest pain (intermittent mild epigastric). Negative for palpitations and leg swelling.   Gastrointestinal: Positive for constipation. Negative for abdominal distention, abdominal pain, diarrhea, nausea and vomiting.   Musculoskeletal: Negative for back pain.   Skin: Negative for wound.   Neurological: Negative for weakness.   Psychiatric/Behavioral: Positive for agitation, behavioral problems and confusion.     Objective:     Vital Signs (Most Recent):  Temp: 97.2 °F (36.2 °C) (09/28/17 1247)  Pulse: 104 (09/28/17 1247)  Resp: 18 (09/28/17 1247)  BP: (!)  143/69 (09/28/17 1247)  SpO2: 95 % (09/28/17 1247) Vital Signs (24h Range):  Temp:  [96.8 °F (36 °C)-98 °F (36.7 °C)] 97.2 °F (36.2 °C)  Pulse:  [] 104  Resp:  [16-18] 18  SpO2:  [94 %-98 %] 95 %  BP: (143-181)/(69-96) 143/69     Weight: 61.8 kg (136 lb 3.9 oz)  Body mass index is 26.61 kg/m².    Intake/Output Summary (Last 24 hours) at 09/28/17 1556  Last data filed at 09/28/17 1200   Gross per 24 hour   Intake             1170 ml   Output              625 ml   Net              545 ml      Physical Exam   Constitutional: She appears well-developed and well-nourished.   HENT:   Head: Normocephalic and atraumatic.   Mouth/Throat: Oropharynx is clear and moist.   Eyes: Conjunctivae and EOM are normal. Pupils are equal, round, and reactive to light.   Neck: Normal range of motion. Neck supple.   Cardiovascular: Normal rate and regular rhythm.    No murmur heard.  Pulmonary/Chest: Effort normal and breath sounds normal. No respiratory distress. She has no wheezes.   Abdominal: Soft. Bowel sounds are normal. She exhibits no distension and no mass. There is no tenderness. There is no rebound and no guarding. No hernia.   Musculoskeletal: Normal range of motion.   Neurological: She is alert. She is disoriented.   Oriented to self only   Skin: Skin is warm. Capillary refill takes less than 2 seconds.   Psychiatric: She has a normal mood and affect. Thought content normal. She exhibits abnormal recent memory.   Patient oriented x 1 on exam but answers questions regarding pain and symptoms with some difficulty recalling frequency and characteristics of symptoms; nurse later reports patient agitation and pulling out IV's and monitor and uncooperative for stress test.    Nursing note and vitals reviewed.      Significant Labs:   CBC:   Recent Labs  Lab 09/27/17 1915   WBC 10.20   HGB 14.4   HCT 43.2        CMP:   Recent Labs  Lab 09/27/17 1915   *   K 3.5   CL 94*   CO2 26   *   BUN 15   CREATININE  0.8   CALCIUM 10.3   ANIONGAP 11   EGFRNONAA >60     Troponin:   Recent Labs  Lab 09/27/17  1915 09/28/17  0128 09/28/17  0551   TROPONINI <0.006 0.028* 0.014       Significant Imaging:     Assessment/Plan:      * Chest pain    Likely Gastric etiology + PPI  Relieved with Antacid per EMS  Monitor on telemetry  Second troponin slightly elevated but then normalized; pt unable to tolerate stress test today; rescheduled for a.m.; repeat CE x 1 set in A.M.  Lipid panel okay - no statin indicated  Continue ASA and ACEI          Constipation--Possible    Miralax daily  KUB shows moderate stool + dulcolax          Essential hypertension    Chronic but elevated today; could be secondary to agitation; continue to monitor; max ACEI and CCB on board; will add BB following stress test.            Dementia    Not on any chronic medications.  Zyprexa IM prn agitation            VTE Risk Mitigation         Ordered     Low Risk of VTE  Once      09/28/17 0120              Karen Francis NP  Department of Hospital Medicine   [facility]

## 2017-09-28 NOTE — NURSING
Upon rounding, pt had taken off her telemetry monitor off and pulled out of her PIV.  PIV placed and telemetry replaced but she continues to attempt to get out of bed and remove telemetry monitor.  Mitts placed bilaterally.

## 2017-09-28 NOTE — PLAN OF CARE
Problem: Pain, Acute (Adult)  Intervention: Support/Optimize Psychosocial Response to Acute Pain   09/28/17 0152   Psychosocial Support   Trust Relationship/Rapport care explained;choices provided;emotional support provided         Problem: Confusion, Acute (Adult)  Intervention: Provide Frequent Orientation/Reorientation   09/28/17 0152   Cognitive Interventions   Reorientation Measures reorientation provided   Sensory Stimulation Regulation auditory stimulation minimized;care clustered;lighting decreased

## 2017-09-28 NOTE — PLAN OF CARE
Problem: Fall Risk (Adult)  Intervention: Patient Rounds   09/28/17 0155   Safety Interventions   Patient Rounds bed in low position;bed wheels locked;call light in reach;clutter free environment maintained;ID band on;placement of personal items at bedside;toileting offered;visualized patient

## 2017-09-28 NOTE — NURSING
Pt continues to get out of bed and take mitts off, grand daughter Carmen notified of pt's behavior and ask if there was anyone that could sit with the pt at this time to prevent her from harming herself.  She stated someone will be here shortly.  She also spoke with patient in regards to her behavior but that didn't change her behavior either.  Re-oriented and instructed on safety several times.  Will continue to monitor.  Avasys at bedside.

## 2017-09-28 NOTE — PLAN OF CARE
CM called grand miranda Mortensen [phone] who handles patient's affairs, patient lives at [facility]. Patient does not have a POA or living will. Patient does not have HH, PCP is Dr. Rodriguez, family will provide transportation back to [place] but live an hour away so will need to be notified of discharge so that they can plan for travel time. CM explained MORALES letter to grand pierre over the phone and documented on letter that she was verbally notified of observation status.      09/28/17 1153   Discharge Assessment   Assessment Type Discharge Planning Assessment   Confirmed/corrected address and phone number on facesheet? Yes   Assessment information obtained from? Caregiver   Communicated expected length of stay with patient/caregiver yes   Prior to hospitilization cognitive status: Unable to Assess   Prior to hospitalization functional status: Needs Assistance   Current cognitive status: Not Oriented to Person;Not Oriented to Place;Not Oriented to Time   Current Functional Status: Needs Assistance   Facility Arrived From: [facility]   Lives With facility resident   Able to Return to Prior Arrangements yes   Is patient able to care for self after discharge? No   Who are your caregiver(s) and their phone number(s)? grand miranda Mortensen [phone]   Patient's perception of discharge disposition care home care facility   Readmission Within The Last 30 Days no previous admission in last 30 days   Patient currently being followed by outpatient case management? No   Patient currently receives any other outside agency services? No   Equipment Currently Used at Home none   Do you have any problems affording any of your prescribed medications? No   Is the patient taking medications as prescribed? yes   Does the patient have transportation home? Yes   Transportation Available family or friend will provide   Does the patient receive services at the [facility]? No    Discharge Plan A Assisted Living   Patient/Family In Agreement With Plan yes

## 2017-09-28 NOTE — SUBJECTIVE & OBJECTIVE
Past Medical History:   Diagnosis Date    Hypertension        Past Surgical History:   Procedure Laterality Date    CATARACT EXTRACTION W/  INTRAOCULAR LENS IMPLANT      COLONOSCOPY  1999    normal per patient.      HYSTERECTOMY         Review of patient's allergies indicates:  No Known Allergies    No current facility-administered medications on file prior to encounter.      Current Outpatient Prescriptions on File Prior to Encounter   Medication Sig    amlodipine (NORVASC) 10 MG tablet Take 1 tablet (10 mg total) by mouth once daily.    aspirin 81 MG Chew Take 81 mg by mouth once daily.    cloNIDine (CATAPRES) 0.1 MG tablet Take one tablet q 6 hr prn blood pressure systolic over 180 or diastolic over 100 (Patient taking differently: Take one tablet every 6 hours as needed for blood pressure systolic over 180 or diastolic over 100)    cyanocobalamin (VITAMIN B-12) 100 MCG tablet Take 100 mcg by mouth once daily.    hydrochlorothiazide (MICROZIDE) 12.5 mg capsule Take 1 capsule (12.5 mg total) by mouth once daily.    lisinopril (PRINIVIL,ZESTRIL) 40 MG tablet Take 1 tablet (40 mg total) by mouth 2 (two) times daily.    lorazepam (ATIVAN) 1 MG tablet Take 1 tablet (1 mg total) by mouth daily as needed for Anxiety (max 1/day for agitation before bath).     Family History     Problem Relation (Age of Onset)    Diabetes Mother    Heart disease Father        Social History Main Topics    Smoking status: Never Smoker    Smokeless tobacco: Never Used    Alcohol use No    Drug use: No    Sexual activity: Not on file     Review of Systems   Unable to perform ROS: Dementia     Objective:     Vital Signs (Most Recent):  Temp: 98 °F (36.7 °C) (09/27/17 1844)  Pulse: 96 (09/27/17 1910)  Resp: 16 (09/27/17 1844)  BP: (!) 167/79 (09/27/17 1844)  SpO2: 95 % (09/27/17 1910) Vital Signs (24h Range):  Temp:  [98 °F (36.7 °C)] 98 °F (36.7 °C)  Pulse:  [] 96  Resp:  [16] 16  SpO2:  [95 %-96 %] 95 %  BP: (167)/(79)  167/79     Weight: 61.7 kg (136 lb)  Body mass index is 24.09 kg/m².    Physical Exam   Constitutional: No distress.   HENT:   Head: Normocephalic.   Eyes: Pupils are equal, round, and reactive to light.   Neck: Normal range of motion. Neck supple. No JVD present. No tracheal deviation present.   Cardiovascular: Normal rate, regular rhythm, normal heart sounds and intact distal pulses.    Pulmonary/Chest: Effort normal and breath sounds normal. No respiratory distress.   Abdominal: Bowel sounds are normal. She exhibits distension. There is no tenderness.   Abdomen Semi-firm   Musculoskeletal: Normal range of motion. She exhibits no edema.   Neurological: She is alert. She is disoriented.   Forgetful   Skin: Skin is warm, dry and intact. Capillary refill takes less than 2 seconds.   Psychiatric:   Pleasantly Confused        Significant Labs:   BMP:   Recent Labs  Lab 09/27/17 1915   *   *   K 3.5   CL 94*   CO2 26   BUN 15   CREATININE 0.8   CALCIUM 10.3     CBC:   Recent Labs  Lab 09/27/17 1915   WBC 10.20   HGB 14.4   HCT 43.2        Troponin:   Recent Labs  Lab 09/27/17 1915   TROPONINI <0.006     D-dimer: 2.78    EKG: / No Ectopy    Significant Imaging:     CXR: Reviewed film, looks ok    CTA Chest PE Protocol: Reviewed Radiologist's report-- 1. There is bibasilar atelectatic change or scarring. Cannot exclude a focus of dense pneumonitis in  the left lower lobe base.  2. There is a moderate hiatal hernia.  3. No visible acute pulmonary embolism.

## 2017-09-28 NOTE — ED NOTES
Patient identifiers for Tiffany Rivers checked and correct.  LOC:  Patient is awake, alert, and aware of environment with an appropriate affect. Patient is oriented x 2 and speaking appropriately.  APPEARANCE:  Patient resting comfortably and in no acute distress. Patient is clean and well groomed, patient's clothing is properly fastened.  SKIN:  The skin is warm and dry. Patient has normal skin turgor and moist mucus membranes. Skin is intact; no bruising or breakdown noted.  MUSCULOSKELETAL:  Patient is moving all extremities well, no obvious deformities noted. Generalizes weakness. Pulses intact.   RESPIRATORY:  Airway is open and patent. Respirations are spontaneous and non-labored with normal effort and rate.  CARDIAC:  Patient has a normal rate, tachycardia noted. +1 edema noted to adam lower ext. Capillary refill < 3 seconds. Intermittent chest pain, pt denies at this moment.  ABDOMEN:  No distention noted.  Soft and non-tender upon palpation.  NEUROLOGICAL:  PERRL. Facial expression is symmetrical. Hand grasps are equal bilaterally. Normal sensation in all extremities when touched with finger.  Allergies reported:  Review of patient's allergies indicates:  No Known Allergies  OTHER NOTES: Pt presents to ER via EMS for evaluation of chest pain that started yesterday evening. Pt states she does not remember where her chest pain hurts. Pt denies N/V, or diarrhea, fever, or diaphoresis during episode of chest pain. Pt denies pain at this time and denies any other complaints. Pt is tachycardic on monitor, lung sounds clear. Pt is disoriented to time, resting comfortably in bed, locked, lowest position, side rails up. Will continue to monitor.

## 2017-09-28 NOTE — ASSESSMENT & PLAN NOTE
Chronic/Controlled. Continue chronic medications, adjusting as needed  Consider adding BB for heart rate control if stress test is not warranted.

## 2017-09-28 NOTE — NURSING
Pt became combative with staff, attempting to get of bed several times,biting mitts and getting them off once.  Cardiology nurse attempted to take pt for her stress/echo but pt has been too anxious and combative to have test completed at this time.  Dr. Farley notified of pt's behavior, order for geodon 5 mg IM given and administered.

## 2017-09-28 NOTE — ASSESSMENT & PLAN NOTE
Likely Gastric etiology  Relieved with Antacid per EMS  Monitor on telemetry  Trend troponin  Keep NPO after midnight for possible stress test  Lipid panel in AM  Consider cardiology consult if chest pain recurs or troponin (+).  Continue ASA and ACEI

## 2017-09-28 NOTE — H&P
[facility] Hospital Medicine  History & Physical    Patient Name: Tiffany Rivers  MRN: [id number]  Admission Date: 9/27/2017  Attending Physician: Gopi Farley MD   Primary Care Provider: Imelda Rodriguez MD         Patient information was obtained from ER records.     Subjective:     Principal Problem:Chest pain    Chief Complaint:   Chief Complaint   Patient presents with    Chest Pain        HPI: Ms. Rivers is an 86yo F with a PMH of HTN and Dementia. She resides at a [facility] and was sent to the ED when she developed CP. Patient is a poor historian and information is obtained from ED records. Per EMS, the pt c/o of mid sternal CP that was  resolved with an anti-acid. While in the ED, her D-Dimer was elevated and a CTA chest was done, which was (-) for a PE. Her troponin was also negative. She was bolused with 500cc NS for tachycardia. She currently denies pain.    Past Medical History:   Diagnosis Date    Hypertension        Past Surgical History:   Procedure Laterality Date    CATARACT EXTRACTION W/  INTRAOCULAR LENS IMPLANT      COLONOSCOPY  1999    normal per patient.      HYSTERECTOMY         Review of patient's allergies indicates:  No Known Allergies    No current facility-administered medications on file prior to encounter.      Current Outpatient Prescriptions on File Prior to Encounter   Medication Sig    amlodipine (NORVASC) 10 MG tablet Take 1 tablet (10 mg total) by mouth once daily.    aspirin 81 MG Chew Take 81 mg by mouth once daily.    cloNIDine (CATAPRES) 0.1 MG tablet Take one tablet q 6 hr prn blood pressure systolic over 180 or diastolic over 100 (Patient taking differently: Take one tablet every 6 hours as needed for blood pressure systolic over 180 or diastolic over 100)    cyanocobalamin (VITAMIN B-12) 100 MCG tablet Take 100 mcg by mouth once daily.    hydrochlorothiazide (MICROZIDE) 12.5 mg capsule Take 1 capsule (12.5 mg total)  by mouth once daily.    lisinopril (PRINIVIL,ZESTRIL) 40 MG tablet Take 1 tablet (40 mg total) by mouth 2 (two) times daily.    lorazepam (ATIVAN) 1 MG tablet Take 1 tablet (1 mg total) by mouth daily as needed for Anxiety (max 1/day for agitation before bath).     Family History     Problem Relation (Age of Onset)    Diabetes Mother    Heart disease Father        Social History Main Topics    Smoking status: Never Smoker    Smokeless tobacco: Never Used    Alcohol use No    Drug use: No    Sexual activity: Not on file     Review of Systems   Unable to perform ROS: Dementia     Objective:     Vital Signs (Most Recent):  Temp: 98 °F (36.7 °C) (09/27/17 1844)  Pulse: 96 (09/27/17 1910)  Resp: 16 (09/27/17 1844)  BP: (!) 167/79 (09/27/17 1844)  SpO2: 95 % (09/27/17 1910) Vital Signs (24h Range):  Temp:  [98 °F (36.7 °C)] 98 °F (36.7 °C)  Pulse:  [] 96  Resp:  [16] 16  SpO2:  [95 %-96 %] 95 %  BP: (167)/(79) 167/79     Weight: 61.7 kg (136 lb)  Body mass index is 24.09 kg/m².    Physical Exam   Constitutional: No distress.   HENT:   Head: Normocephalic.   Eyes: Pupils are equal, round, and reactive to light.   Neck: Normal range of motion. Neck supple. No JVD present. No tracheal deviation present.   Cardiovascular: Normal rate, regular rhythm, normal heart sounds and intact distal pulses.    Pulmonary/Chest: Effort normal and breath sounds normal. No respiratory distress.   Abdominal: Bowel sounds are normal. She exhibits distension. There is no tenderness.   Abdomen is Semi-firm   Musculoskeletal: Normal range of motion. She exhibits no edema.   Neurological: She is alert. She is disoriented.   Forgetful   Skin: Skin is warm, dry and intact. Capillary refill takes less than 2 seconds.   Psychiatric:   Pleasantly Confused        Significant Labs:   BMP:   Recent Labs  Lab 09/27/17 1915   *   *   K 3.5   CL 94*   CO2 26   BUN 15   CREATININE 0.8   CALCIUM 10.3     CBC:   Recent Labs  Lab  09/27/17 1915   WBC 10.20   HGB 14.4   HCT 43.2        Troponin:   Recent Labs  Lab 09/27/17 1915   TROPONINI <0.006     D-dimer: 2.78    EKG: / No Ectopy    Significant Imaging:     CXR: Reviewed film, looks ok    CTA Chest PE Protocol: Reviewed Radiologist's report-- 1. There is bibasilar atelectatic change or scarring. Cannot exclude a focus of dense pneumonitis in  the left lower lobe base.  2. There is a moderate hiatal hernia.  3. No visible acute pulmonary embolism.    Assessment/Plan:     * Chest pain    Likely Gastric etiology  Relieved with Antacid per EMS  Monitor on telemetry  Trend troponin  Keep NPO after midnight for possible stress test  Lipid panel in AM  Consider cardiology consult if chest pain recurs or troponin (+).  Continue ASA and ACEI          Essential hypertension    Chronic/Controlled. Continue chronic medications, adjusting as needed  Consider adding BB for heart rate control if stress test is not warranted.          Constipation--Possible    Miralax daily  KUB in AM          Dementia    Not on any chronic medications.  Zyprexa IM prn agitation            VTE Risk Mitigation         Ordered     Low Risk of VTE  Once      09/28/17 0120             Jillian Sun NP  Department of Hospital Medicine   [facility]

## 2017-09-28 NOTE — NURSING
Disoriented and confused through the night- pulled off monitor several time.  getting out of bed several times- bed alarm remains activated.  She requires freq. Reorientation.  No c/o chest pain.  Will monitor.

## 2017-09-28 NOTE — UM SECONDARY REVIEW
Physician Advisor External    Level of Care Issue    Approved Observation for admit 9/27/17 and inpatient for 9/28/17 per dr haji at [facility]..

## 2017-09-28 NOTE — HPI
Ms. Rivers is an 84yo F with a PMH of HTN and Dementia. She resides at a [facility] and was sent to the ED when she developed CP. Patient is a poor historian and information is obtained from ED records. Per EMS, the pt c/o of mid sternal CP that was  resolved with an anti-acid. While in the ED, her D-Dimer was elevated and a CTA chest was done, which was (-) for a PE. Her troponin was also negative. She was bolused with 500cc NS for tachycardia. She currently denies pain.

## 2017-09-29 ENCOUNTER — TELEPHONE (OUTPATIENT)
Dept: MEDSURG UNIT | Facility: HOSPITAL | Age: 82
End: 2017-09-29

## 2017-09-29 ENCOUNTER — HOSPITAL ENCOUNTER (EMERGENCY)
Facility: HOSPITAL | Age: 82
End: 2017-09-29
Attending: EMERGENCY MEDICINE
Payer: MEDICARE

## 2017-09-29 VITALS
TEMPERATURE: 98 F | WEIGHT: 136 LBS | BODY MASS INDEX: 26.7 KG/M2 | DIASTOLIC BLOOD PRESSURE: 76 MMHG | OXYGEN SATURATION: 95 % | HEART RATE: 95 BPM | RESPIRATION RATE: 16 BRPM | SYSTOLIC BLOOD PRESSURE: 167 MMHG | HEIGHT: 60 IN

## 2017-09-29 VITALS
OXYGEN SATURATION: 96 % | RESPIRATION RATE: 18 BRPM | HEIGHT: 60 IN | WEIGHT: 136.25 LBS | HEART RATE: 87 BPM | DIASTOLIC BLOOD PRESSURE: 66 MMHG | BODY MASS INDEX: 26.75 KG/M2 | SYSTOLIC BLOOD PRESSURE: 138 MMHG | TEMPERATURE: 98 F

## 2017-09-29 DIAGNOSIS — S00.83XA FOREHEAD CONTUSION, INITIAL ENCOUNTER: ICD-10-CM
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
## 2017-09-29 DIAGNOSIS — S01.81XA FOREHEAD LACERATION, INITIAL ENCOUNTER: Primary | ICD-10-CM

## 2017-09-29 LAB
ANION GAP SERPL CALC-SCNC: 10 MMOL/L
BASOPHILS # BLD AUTO: 0 K/UL
BASOPHILS NFR BLD: 0.3 %
BUN SERPL-MCNC: 11 MG/DL
CALCIUM SERPL-MCNC: 9.6 MG/DL
CHLORIDE SERPL-SCNC: 95 MMOL/L
CK MB SERPL-MCNC: 3.7 NG/ML
CK MB SERPL-RTO: 2.7 %
CK SERPL-CCNC: 138 U/L
CK SERPL-CCNC: 138 U/L
CO2 SERPL-SCNC: 28 MMOL/L
CREAT SERPL-MCNC: 0.7 MG/DL
DIASTOLIC DYSFUNCTION: NO
DIFFERENTIAL METHOD: ABNORMAL
EOSINOPHIL # BLD AUTO: 0.2 K/UL
EOSINOPHIL NFR BLD: 2.1 %
ERYTHROCYTE [DISTWIDTH] IN BLOOD BY AUTOMATED COUNT: 12.7 %
EST. GFR  (AFRICAN AMERICAN): >60 ML/MIN/1.73 M^2
EST. GFR  (NON AFRICAN AMERICAN): >60 ML/MIN/1.73 M^2
GLUCOSE SERPL-MCNC: 97 MG/DL
HCT VFR BLD AUTO: 43 %
HGB BLD-MCNC: 14.4 G/DL
LYMPHOCYTES # BLD AUTO: 1.5 K/UL
LYMPHOCYTES NFR BLD: 14.9 %
MCH RBC QN AUTO: 30.4 PG
MCHC RBC AUTO-ENTMCNC: 33.5 G/DL
MCV RBC AUTO: 91 FL
MONOCYTES # BLD AUTO: 1 K/UL
MONOCYTES NFR BLD: 9.5 %
NEUTROPHILS # BLD AUTO: 7.5 K/UL
NEUTROPHILS NFR BLD: 73.2 %
PLATELET # BLD AUTO: 306 K/UL
PMV BLD AUTO: 7.5 FL
POTASSIUM SERPL-SCNC: 3.9 MMOL/L
RBC # BLD AUTO: 4.74 M/UL
RETIRED EF AND QEF - SEE NOTES: 71 (ref 55–65)
SODIUM SERPL-SCNC: 133 MMOL/L
TROPONIN I SERPL DL<=0.01 NG/ML-MCNC: 0.02 NG/ML
WBC # BLD AUTO: 10.3 K/UL

## 2017-09-29 PROCEDURE — 80048 BASIC METABOLIC PNL TOTAL CA: CPT

## 2017-09-29 PROCEDURE — 25000003 PHARM REV CODE 250: Performed by: NURSE PRACTITIONER

## 2017-09-29 PROCEDURE — 93005 ELECTROCARDIOGRAM TRACING: CPT

## 2017-09-29 PROCEDURE — 63600175 PHARM REV CODE 636 W HCPCS

## 2017-09-29 PROCEDURE — 99284 EMERGENCY DEPT VISIT MOD MDM: CPT | Mod: 25

## 2017-09-29 PROCEDURE — 82553 CREATINE MB FRACTION: CPT

## 2017-09-29 PROCEDURE — 36415 COLL VENOUS BLD VENIPUNCTURE: CPT

## 2017-09-29 PROCEDURE — 12013 RPR F/E/E/N/L/M 2.6-5.0 CM: CPT

## 2017-09-29 PROCEDURE — 84484 ASSAY OF TROPONIN QUANT: CPT

## 2017-09-29 PROCEDURE — 85025 COMPLETE CBC W/AUTO DIFF WBC: CPT

## 2017-09-29 RX ORDER — ATROPINE SULFATE 0.1 MG/ML
INJECTION INTRAVENOUS
Status: DISCONTINUED
Start: 2017-09-29 | End: 2017-09-29 | Stop reason: WASHOUT

## 2017-09-29 RX ORDER — LIDOCAINE HCL/EPINEPHRINE/PF 2%-1:200K
10 VIAL (ML) INJECTION ONCE
Status: DISCONTINUED | OUTPATIENT
Start: 2017-09-29 | End: 2017-09-30 | Stop reason: HOSPADM

## 2017-09-29 RX ORDER — PANTOPRAZOLE SODIUM 40 MG/1
40 TABLET, DELAYED RELEASE ORAL DAILY
Qty: 30 TABLET | Refills: 11 | OUTPATIENT
Start: 2017-09-29 | End: 2018-09-29

## 2017-09-29 RX ORDER — DOBUTAMINE HYDROCHLORIDE 200 MG/100ML
50 INJECTION INTRAVENOUS ONCE
Status: DISCONTINUED | OUTPATIENT
Start: 2017-09-29 | End: 2017-09-29 | Stop reason: HOSPADM

## 2017-09-29 RX ORDER — POLYETHYLENE GLYCOL 3350 17 G/17G
17 POWDER, FOR SOLUTION ORAL DAILY
Qty: 7 EACH | Refills: 0 | OUTPATIENT
Start: 2017-09-29 | End: 2017-10-06

## 2017-09-29 RX ORDER — CARVEDILOL 3.12 MG/1
3.12 TABLET ORAL 2 TIMES DAILY WITH MEALS
Qty: 60 TABLET | Refills: 11 | Status: SHIPPED | OUTPATIENT
Start: 2017-09-29 | End: 2017-10-16 | Stop reason: ALTCHOICE

## 2017-09-29 RX ADMIN — POLYETHYLENE GLYCOL (3350) 17 G: 17 POWDER, FOR SOLUTION ORAL at 09:09

## 2017-09-29 RX ADMIN — ASPIRIN 81 MG CHEWABLE TABLET 81 MG: 81 TABLET CHEWABLE at 09:09

## 2017-09-29 RX ADMIN — VITAM B12 100 MCG: 100 TAB at 09:09

## 2017-09-29 RX ADMIN — LISINOPRIL 40 MG: 40 TABLET ORAL at 09:09

## 2017-09-29 RX ADMIN — PANTOPRAZOLE SODIUM 40 MG: 40 TABLET, DELAYED RELEASE ORAL at 09:09

## 2017-09-29 RX ADMIN — AMLODIPINE BESYLATE 10 MG: 5 TABLET ORAL at 09:09

## 2017-09-29 RX ADMIN — HYDROCHLOROTHIAZIDE 12.5 MG: 12.5 TABLET ORAL at 09:09

## 2017-09-29 NOTE — NURSING
Spoke with [facility]. Gave report and they stated they would get in touch with the family to pick her up. They do not have a  service.

## 2017-09-29 NOTE — PLAN OF CARE
09/29/17 1252   Final Note   Assessment Type Final Discharge Note   Discharge Disposition Home  ([place])   Hospital Follow Up  Appt(s) scheduled? Yes

## 2017-09-29 NOTE — DISCHARGE SUMMARY
[facility] Medicine  Discharge Summary      Patient Name: Tiffany Rivers  MRN: [id number]  Admission Date: 9/27/2017  Hospital Length of Stay: 1 days  Discharge Date and Time:  09/29/2017 12:28 PM  Attending Physician: Gopi Farley MD   Discharging Provider: Karen Francis NP  Primary Care Provider: Imelda Rodriguez MD      HPI:   Ms. Rivers is an 84yo F with a PMH of HTN and Dementia. She resides at a [facility] and was sent to the ED when she developed CP. Patient is a poor historian and information is obtained from ED records. Per EMS, the pt c/o of mid sternal CP that was  resolved with an anti-acid. While in the ED, her D-Dimer was elevated and a CTA chest was done, which was (-) for a PE. Her troponin was also negative. She was bolused with 500cc NS for tachycardia. She currently denies pain.    * No surgery found *      Indwelling Lines/Drains at time of discharge:   Lines/Drains/Airways          No matching active lines, drains, or airways        Hospital Course:   Tiffany Rivers is an 85 year old female with a PMH of HTN and Dementia from [facility]  admitted to [facility] on 9/27/2017 for chest pain. Patient had unremarkable EKG's. Cardiac enzymes negative with the exception of second troponin slightly elevated; CE's repeated x 2 were normal.  Although patient poor historian due to dementia her chest pain is atypical; relieved by antacid and MARINA score 2;  Patient did not tolerate initial attempt at stress test due to  agitation which is now resolved; Dobutamine stress echo done today was unremarkable; patient tolerated well.  Patient's vital signs remain stable.  D- Dimer elevated on admission; CTA negative. Patient had tachycardia and HTN intermittently throughout hospitalization; given IVF's and BB initiated.  Labs unremarkable today - see labs; PPI initiated. KUB revealed constipation; mirilax and dulcolax prescribed; patient's  abdomen soft without tenderness or distention on exam; she has no complaints of pain; she states she suffers from chronic constipation.  Patient will be discharged to [facility] and follow up with PCP next week.      Consults:     Significant Diagnostic Studies: Labs:   BMP:   Recent Labs  Lab 09/27/17 1915 09/29/17  0450   * 97   * 133*   K 3.5 3.9   CL 94* 95   CO2 26 28   BUN 15 11   CREATININE 0.8 0.7   CALCIUM 10.3 9.6   , CBC   Recent Labs  Lab 09/27/17 1915 09/29/17  0450   WBC 10.20 10.30   HGB 14.4 14.4   HCT 43.2 43.0    306   , Lipid Panel   Lab Results   Component Value Date    CHOL 193 09/28/2017    HDL 56 09/28/2017    LDLCALC 127.2 09/28/2017    TRIG 49 09/28/2017    CHOLHDL 29.0 09/28/2017    and Troponin   Recent Labs  Lab 09/29/17 0450   TROPONINI 0.017       Pending Diagnostic Studies:     Procedure Component Value Units Date/Time    EKG 12-lead [[id number]]     Order Status:  Sent Lab Status:  No result         Final Active Diagnoses:    Diagnosis Date Noted POA    PRINCIPAL PROBLEM:  Chest pain [R07.9] 09/27/2017 Yes    Constipation--Possible [K59.00] 09/28/2017 Yes    Essential hypertension [I10] 11/25/2014 Yes    Dementia [F03.90] 10/23/2013 Yes      Problems Resolved During this Admission:    Diagnosis Date Noted Date Resolved POA      No new Assessment & Plan notes have been filed under this hospital service since the last note was generated.  Service: Hospital Medicine      Discharged Condition: fair    Disposition: Long Term Care    Follow Up:  Follow-up Information     Imelda Rodriguez MD In 1 week.    Specialty:  Family Medicine  Why:  hospital follow up   Contact information:  [address] [facility] [address] [phone]                 Patient Instructions:     Diet Low Sodium, 2gm     Call MD for:  increased confusion or weakness     Call MD for:  difficulty breathing or increased cough     Call MD for:  temperature >100.4        Medications:  Reconciled Home Medications:   Current Discharge Medication List      START taking these medications    Details   carvedilol (COREG) 3.125 MG tablet Take 1 tablet (3.125 mg total) by mouth 2 (two) times daily with meals.  Qty: 60 tablet, Refills: 11         CONTINUE these medications which have NOT CHANGED    Details   amlodipine (NORVASC) 10 MG tablet Take 1 tablet (10 mg total) by mouth once daily.  Qty: 90 tablet, Refills: 3      aspirin 81 MG Chew Take 81 mg by mouth once daily.      cyanocobalamin (VITAMIN B-12) 100 MCG tablet Take 100 mcg by mouth once daily.      hydrochlorothiazide (MICROZIDE) 12.5 mg capsule Take 1 capsule (12.5 mg total) by mouth once daily.  Qty: 90 capsule, Refills: 3      lisinopril (PRINIVIL,ZESTRIL) 40 MG tablet Take 1 tablet (40 mg total) by mouth 2 (two) times daily.  Qty: 180 tablet, Refills: 3    Comments: New dosing of BID  Associated Diagnoses: Essential hypertension      cloNIDine (CATAPRES) 0.1 MG tablet Take one tablet q 6 hr prn blood pressure systolic over 180 or diastolic over 100  Qty: 40 tablet, Refills: 3    Comments: This prescription was filled today. Any refills authorized will be placed on file.  Associated Diagnoses: Essential hypertension      lorazepam (ATIVAN) 1 MG tablet Take 1 tablet (1 mg total) by mouth daily as needed for Anxiety (max 1/day for agitation before bath).  Qty: 15 tablet, Refills: 0    Associated Diagnoses: Dementia with behavioral disturbance, unspecified dementia type; Agitation           Time spent on the discharge of patient: 30 minutes      Karen Francis NP  Department of Hospital Medicine  [facility]

## 2017-09-29 NOTE — PLAN OF CARE
Problem: Patient Care Overview  Goal: Plan of Care Review  Outcome: Ongoing (interventions implemented as appropriate)  POC reviewed with pt, verbalized understanding. NAD noted. VS monitored. Incontinence care provided. Reorientation provided. NPO. Pt confused trying to get out of bed, taking of tele monitor. Family at bedside beginning of shift. Ativan given for agitation. Avasys at bedside. Bed alarm on all shift . Side rails up x4 for pt safety. Hourly rounding performed. SR on tele, HR 78. Hourly rounding performed. Free of fall or injury. Will  Continue to monitor.

## 2017-09-30 NOTE — ED NOTES
"Patient identifiers for Tiffany Rivers checked and correct.  LOC:  Patient is awake, alert, and aware of environment with an appropriate affect. Patient is oriented to person and place.  APPEARANCE:  Patient resting comfortably and in no acute distress. Patient is clean and well groomed, patient's clothing is properly fastened.  SKIN:  The skin is warm and dry. Patient has normal skin turgor and moist mucus membranes. Skin, small 1 1/2" laceration to right forehead.  MUSCULOSKELETAL:  Patient is moving all extremities well, no obvious deformities noted. Pulses intact.   RESPIRATORY:  Airway is open and patent. Respirations are spontaneous and non-labored with normal effort and rate.  CARDIAC:  Patient has a normal rate and rhythm. No peripheral edema noted. Capillary refill < 3 seconds.  ABDOMEN:  No distention noted.  Soft and non-tender upon palpation.  NEUROLOGICAL:  PERRL. Facial expression is symmetrical. Hand grasps are equal bilaterally. Normal sensation in all extremities when touched with finger.  Allergies reported:  Review of patient's allergies indicates:  No Known Allergies  OTHER NOTES:  Patient states she fell tonight, no LOC.  "

## 2017-09-30 NOTE — ED PROVIDER NOTES
Encounter Date: 9/29/2017    SCRIBE #1 NOTE: Bekah HERRERA am scribing for, and in the presence of, Dr. Delarosa.       History     Chief Complaint   Patient presents with    Fall     pt fell at [facility] while getting dress with assistance; no LOC; no emesis; c/o pain to top of head and laceration present     9/29/2017  8:40 PM     Chief Complaint: Fall    The patient is a 85 y.o. female with PMHx of HTN who presents with fall. The patient fell at [facility] while getting dressed with assistance. Patient lost her balance and fell unto the door knob. No LOC. No vomiting. EMS report a laceration to the frontal region of head without any active bleeding. The patient complains of mild pain to the top of the head near the site of the laceration. She denies any neck or back pain. No other pain. No other injuries. Shx of hysterectomy, colonoscopy, cataract extraction with intraocular lens implant.      The history is provided by the patient and the EMS personnel.     Review of patient's allergies indicates:  No Known Allergies  Past Medical History:   Diagnosis Date    Hypertension      Past Surgical History:   Procedure Laterality Date    CATARACT EXTRACTION W/  INTRAOCULAR LENS IMPLANT      COLONOSCOPY  1999    normal per patient.      HYSTERECTOMY       Family History   Problem Relation Age of Onset    Diabetes Mother     Heart disease Father     Cancer Neg Hx      Social History   Substance Use Topics    Smoking status: Never Smoker    Smokeless tobacco: Never Used    Alcohol use No     Review of Systems   Constitutional: Negative for appetite change, chills and fever.   HENT: Negative for congestion, rhinorrhea and sore throat.    Respiratory: Negative for cough and shortness of breath.    Cardiovascular: Negative for chest pain.   Gastrointestinal: Negative for abdominal pain, diarrhea, nausea and vomiting.   Genitourinary: Negative for dysuria.   Musculoskeletal: Negative for  back pain and myalgias.   Skin: Positive for wound (laceration). Negative for rash.   Neurological: Positive for headaches (top of the head near laceration). Negative for weakness and numbness.   Hematological: Does not bruise/bleed easily.   All other systems reviewed and are negative.      Physical Exam     Initial Vitals [09/29/17 2024]   BP Pulse Resp Temp SpO2   (!) 167/76 95 16 98.1 °F (36.7 °C) 95 %      MAP       106.33         Physical Exam    Nursing note and vitals reviewed.  Constitutional: No distress. Cervical collar in place.   HENT:   Head: Normocephalic. Head is with laceration.   Right Ear: External ear normal. No hemotympanum.   Left Ear: External ear normal. No hemotympanum.   Nose: Nose normal.   Mouth/Throat: Mucous membranes are normal.   3.7 cm laceration to the frontal region of the scalp.   Eyes: EOM are normal. Pupils are equal, round, and reactive to light.   Neck: Normal range of motion. Neck supple.   Cardiovascular: Normal rate, regular rhythm, normal heart sounds and intact distal pulses. Exam reveals no gallop and no friction rub.    No murmur heard.  Pulmonary/Chest: Breath sounds normal. She has no wheezes. She has no rhonchi. She has no rales.   Abdominal: Soft. She exhibits no distension. There is no tenderness.   Musculoskeletal: Normal range of motion. She exhibits no edema or tenderness.   Cervical spine is non tender.   Neurological: She is alert. She has normal strength.   Skin: Skin is dry. No rash noted.   Psychiatric: She has a normal mood and affect.         ED Course   Lac Repair  Date/Time: 9/29/2017 9:53 PM  Performed by: SARAH LONGORIA III  Authorized by: SARAH LONGORIA III   Body area: head/neck  Location details: forehead  Laceration length: 3.7 cm  Anesthesia: local infiltration    Anesthesia:  Local Anesthetic: lidocaine 2% with epinephrine  Anesthetic total: 5 mL  Patient sedated: no  Preparation: Patient was prepped and draped in the usual sterile  fashion.  Amount of cleaning: standard  Skin closure: 5-0 Prolene  Number of sutures: 5  Technique: simple  Approximation: close  Approximation difficulty: simple  Patient tolerance: Patient tolerated the procedure well with no immediate complications        Labs Reviewed - No data to display          Medical Decision Making:   Independently Interpreted Test(s):   I have ordered and independently interpreted X-rays - see summary below.       <> Summary of X-Ray Reading(s): Cervical spine x-rays independently interpreted by me demonstrate severe degenerative changes with no acute fracture seen.  ED Management:  Tiffany Rivers is a 85 y.o. female who presents with  a laceration to the forehead sustained during a mechanical fall.  X-rays of the cervical spine demonstrate degenerative changes no acute fracture.  CT of the head fails to demonstrate any significant intracranial injury.            Scribe Attestation:   Scribe #1: I performed the above scribed service and the documentation accurately describes the services I performed. I attest to the accuracy of the note.    Attending Attestation:           Physician Attestation for Scribe:  Physician Attestation Statement for Scribe #1: I, Dr. Delarosa, reviewed documentation, as scribed by Tawny De Anda in my presence, and it is both accurate and complete.                 ED Course      Clinical Impression:   The primary encounter diagnosis was Forehead laceration, initial encounter. Diagnoses of Fall and Forehead contusion, initial encounter were also pertinent to this visit.                           Manolo Delarosa III, MD  09/29/17 2050

## 2017-10-04 DIAGNOSIS — F03.91 DEMENTIA WITH BEHAVIORAL DISTURBANCE, UNSPECIFIED DEMENTIA TYPE: ICD-10-CM

## 2017-10-04 DIAGNOSIS — I10 ESSENTIAL HYPERTENSION: ICD-10-CM

## 2017-10-04 DIAGNOSIS — R45.1 AGITATION: ICD-10-CM

## 2017-10-05 ENCOUNTER — DOCUMENTATION ONLY (OUTPATIENT)
Dept: FAMILY MEDICINE | Facility: CLINIC | Age: 82
End: 2017-10-05

## 2017-10-05 RX ORDER — LORAZEPAM 1 MG/1
1 TABLET ORAL DAILY PRN
Qty: 15 TABLET | Refills: 0 | Status: SHIPPED | OUTPATIENT
Start: 2017-10-05 | End: 2018-01-26 | Stop reason: SDUPTHER

## 2017-10-05 RX ORDER — CLONIDINE HYDROCHLORIDE 0.1 MG/1
TABLET ORAL
Qty: 40 TABLET | Refills: 3 | OUTPATIENT
Start: 2017-10-05

## 2017-10-05 NOTE — PROGRESS NOTES
Pre-Visit Chart Review  For Appointment Scheduled on 10/6/2017    Health Maintenance Due   Topic Date Due    TETANUS VACCINE  02/18/1950    DEXA SCAN  02/18/1972    Zoster Vaccine  02/18/1992    Pneumococcal (65+) (2 of 2 - PCV13) 10/23/2014    Influenza Vaccine  08/01/2017

## 2017-10-06 ENCOUNTER — DOCUMENTATION ONLY (OUTPATIENT)
Dept: FAMILY MEDICINE | Facility: CLINIC | Age: 82
End: 2017-10-06

## 2017-10-06 NOTE — PROGRESS NOTES
Pre-Visit Chart Review  For Appointment Scheduled on 10/9/2017    Health Maintenance Due   Topic Date Due    TETANUS VACCINE  [birth year]18/1950    DEXA SCAN  02/18/1972    Zoster Vaccine  02/18/1992    Pneumococcal (65+) (2 of 2 - PCV13) 10/23/2014    Influenza Vaccine  08/01/2017

## 2017-10-12 ENCOUNTER — TELEPHONE (OUTPATIENT)
Dept: FAMILY MEDICINE | Facility: CLINIC | Age: 82
End: 2017-10-12

## 2017-10-12 DIAGNOSIS — K21.9 GASTROESOPHAGEAL REFLUX DISEASE, ESOPHAGITIS PRESENCE NOT SPECIFIED: Primary | ICD-10-CM

## 2017-10-12 RX ORDER — MAG HYDROX/ALUMINUM HYD/SIMETH 200-200-20
30 SUSPENSION, ORAL (FINAL DOSE FORM) ORAL 2 TIMES DAILY PRN
Qty: 769 ML | Refills: 2 | Status: SHIPPED | OUTPATIENT
Start: 2017-10-12 | End: 2017-10-13 | Stop reason: SDUPTHER

## 2017-10-12 NOTE — TELEPHONE ENCOUNTER
[facility] needs a written/signed order faxed to them at [phone] for patients Maalox order. Please advise.

## 2017-10-12 NOTE — TELEPHONE ENCOUNTER
Notify Chai .  Will prescribe as needed maalox for chest pain related to acid indigestion after eating.  Please make f/u appt asap.

## 2017-10-13 RX ORDER — MAG HYDROX/ALUMINUM HYD/SIMETH 200-200-20
30 SUSPENSION, ORAL (FINAL DOSE FORM) ORAL 2 TIMES DAILY PRN
Qty: 769 ML | Refills: 2 | Status: SHIPPED | OUTPATIENT
Start: 2017-10-13 | End: 2018-10-13

## 2017-10-14 ENCOUNTER — DOCUMENTATION ONLY (OUTPATIENT)
Dept: FAMILY MEDICINE | Facility: CLINIC | Age: 82
End: 2017-10-14

## 2017-10-14 NOTE — PROGRESS NOTES
Pre-Visit Chart Review  For Appointment Scheduled on 10/16/2017    Health Maintenance Due   Topic Date Due    TETANUS VACCINE  02/18/1950    DEXA SCAN  02/18/1972    Zoster Vaccine  02/18/1992    Pneumococcal (65+) (2 of 2 - PCV13) 10/23/2014    Influenza Vaccine  08/01/2017

## 2017-10-16 ENCOUNTER — OFFICE VISIT (OUTPATIENT)
Dept: FAMILY MEDICINE | Facility: CLINIC | Age: 82
End: 2017-10-16
Payer: MEDICARE

## 2017-10-16 VITALS
SYSTOLIC BLOOD PRESSURE: 135 MMHG | TEMPERATURE: 98 F | HEIGHT: 60 IN | BODY MASS INDEX: 26.31 KG/M2 | DIASTOLIC BLOOD PRESSURE: 79 MMHG | HEART RATE: 86 BPM | WEIGHT: 134 LBS

## 2017-10-16 DIAGNOSIS — F03.90 DEMENTIA WITHOUT BEHAVIORAL DISTURBANCE, UNSPECIFIED DEMENTIA TYPE: ICD-10-CM

## 2017-10-16 DIAGNOSIS — S01.01XD LACERATION OF SCALP WITHOUT FOREIGN BODY, SUBSEQUENT ENCOUNTER: Primary | ICD-10-CM

## 2017-10-16 DIAGNOSIS — Z48.02 VISIT FOR SUTURE REMOVAL: ICD-10-CM
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
## 2017-10-16 DIAGNOSIS — I10 HYPERTENSION, WELL CONTROLLED: ICD-10-CM
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
## 2017-10-16 PROCEDURE — 99024 POSTOP FOLLOW-UP VISIT: CPT | Mod: ,,, | Performed by: NURSE PRACTITIONER

## 2017-10-16 PROCEDURE — 99213 OFFICE O/P EST LOW 20 MIN: CPT | Mod: 24,S$PBB,, | Performed by: NURSE PRACTITIONER

## 2017-10-16 PROCEDURE — 99213 OFFICE O/P EST LOW 20 MIN: CPT | Mod: PBBFAC,PO | Performed by: NURSE PRACTITIONER

## 2017-10-16 PROCEDURE — 99999 PR PBB SHADOW E&M-EST. PATIENT-LVL III: CPT | Mod: PBBFAC,,, | Performed by: NURSE PRACTITIONER

## 2017-10-16 NOTE — PROGRESS NOTES
Subjective:       Patient ID: Tiffany Rivers is a 85 y.o. female.    Chief Complaint: Hospital Follow Up    HPI   Chief Complaint  Chief Complaint   Patient presents with    Hospital Follow Up       HPI  Tiffany Rivers is a 85 y.o. female with medical diagnoses as listed in the medical history and problem list, established patient new to me,  that presents for hospital follow up admit to [facility] 9/27-9/29/17 for chest pain with negative cardiac work up, negative pharmacological stress test for ischemia, EF > 70%.  It was felt that chest pain was due to anxiety.  Patient resides at [facility], and on day of hospital discharge was getting ready for bed and fell against door frame and hit head. Patient seen in [facility] ER with scalp laceration, C-spine xray degenerative changes and CT head without acute changes.  Five sutures placed and here today to have removed.  Treated for hypertension and blood pressure is well controlled.  Patient has dementia..  Established patient with last clinic appointment 9/14/2017.  BMI today is 26.17 with 2 pound weight loss since last visit.      PAST MEDICAL HISTORY:  Past Medical History:   Diagnosis Date    Hypertension        PAST SURGICAL HISTORY:  Past Surgical History:   Procedure Laterality Date    CATARACT EXTRACTION W/  INTRAOCULAR LENS IMPLANT      COLONOSCOPY  1999    normal per patient.      HYSTERECTOMY         SOCIAL HISTORY:  Social History     Social History    Marital status:      Spouse name: N/A    Number of children: N/A    Years of education: N/A     Occupational History    Not on file.     Social History Main Topics    Smoking status: Never Smoker    Smokeless tobacco: Never Used    Alcohol use No    Drug use: No    Sexual activity: Not on file     Other Topics Concern    Not on file     Social History Narrative    No narrative on file       FAMILY HISTORY:  Family History   Problem Relation Age of Onset    Diabetes Mother      Heart disease Father     Cancer Neg Hx        ALLERGIES AND MEDICATIONS: updated and reviewed.  Review of patient's allergies indicates:  No Known Allergies  Current Outpatient Prescriptions   Medication Sig Dispense Refill    aluminum-magnesium hydroxide-simethicone (MAALOX) 200-200-20 mg/5 mL Susp Take 30 mLs by mouth 2 (two) times daily as needed (indigestion). 769 mL 2    amlodipine (NORVASC) 10 MG tablet Take 1 tablet (10 mg total) by mouth once daily. 90 tablet 3    aspirin 81 MG Chew Take 81 mg by mouth once daily.      cloNIDine (CATAPRES) 0.1 MG tablet Take one tablet q 6 hr prn blood pressure systolic over 180 or diastolic over 100 (Patient taking differently: Take one tablet every 6 hours as needed for blood pressure systolic over 180 or diastolic over 100) 40 tablet 3    cyanocobalamin (VITAMIN B-12) 100 MCG tablet Take 100 mcg by mouth once daily.      hydrochlorothiazide (MICROZIDE) 12.5 mg capsule Take 1 capsule (12.5 mg total) by mouth once daily. 90 capsule 3    lisinopril (PRINIVIL,ZESTRIL) 40 MG tablet Take 1 tablet (40 mg total) by mouth 2 (two) times daily. 180 tablet 3    lorazepam (ATIVAN) 1 MG tablet Take 1 tablet (1 mg total) by mouth daily as needed for Anxiety (max 1/day for agitation before bath). 15 tablet 0     No current facility-administered medications for this visit.            Review of Systems   Constitutional: Negative for activity change, appetite change, chills, fever and unexpected weight change.   HENT: Negative for congestion, ear pain, postnasal drip, rhinorrhea and trouble swallowing.    Eyes: Negative for visual disturbance.   Respiratory: Negative for cough and shortness of breath.    Cardiovascular: Positive for leg swelling. Negative for chest pain and palpitations.        Edema to bilateral feet and ankles that is intermittent   Gastrointestinal: Negative for abdominal pain, constipation, diarrhea, nausea and vomiting.   Genitourinary: Negative for difficulty  urinating.   Musculoskeletal: Negative for arthralgias and myalgias.   Skin: Positive for wound. Negative for rash.        Forehead/scalp laceration    Neurological: Negative for dizziness, weakness and headaches.        Patient has dementia, accompanied by daughter   Psychiatric/Behavioral: Negative for dysphoric mood, sleep disturbance and suicidal ideas. The patient is not nervous/anxious.        Objective:      Physical Exam   Constitutional: She appears well-developed and well-nourished. No distress.   HENT:   Head: Normocephalic.   Neck: Normal carotid pulses present. Carotid bruit is not present.   Cardiovascular: Normal rate, regular rhythm, S1 normal, S2 normal, normal heart sounds and intact distal pulses.  Exam reveals no gallop and no friction rub.    No murmur heard.  Pulses:       Carotid pulses are 2+ on the right side, and 2+ on the left side.       Radial pulses are 2+ on the right side, and 2+ on the left side.        Posterior tibial pulses are 1+ on the right side, and 1+ on the left side.   Bilateral 2 + non pitting ankle edema   Pulmonary/Chest: Effort normal and breath sounds normal. No respiratory distress. She has no decreased breath sounds. She has no wheezes. She has no rhonchi. She has no rales.   Musculoskeletal: Normal range of motion.   Neurological: She is alert. She is not disoriented.   Skin: Skin is warm and dry. Capillary refill takes less than 2 seconds. Laceration noted. No rash noted. She is not diaphoretic. No erythema. No pallor.        Laceration to forehead/scalp 3 cm in length, healed with 5 sutures in place.  No redness, drainage, S&S of infection.  Sutures removed without difficulty. No additional wound care required.   Psychiatric: She has a normal mood and affect. Her behavior is normal. Cognition and memory are impaired.   Nursing note and vitals reviewed.      Assessment:       1. Laceration of scalp without foreign body, subsequent encounter    2. Visit for suture  removal    3. Hypertension, well controlled    4. Dementia without behavioral disturbance, unspecified dementia type    5. Fall in home, subsequent encounter    6. BMI 26.0-26.9,adult        Plan:       Tiffany was seen today for hospital follow up.  Hospital records, discharge summary,  test results, blood work results reviewed, medication list reconciled.  Diagnoses and all orders for this visit:    Laceration of scalp without foreign body, subsequent encounter   Healed, sutures removed, no additional wound care required  Visit for suture removal   Healed, sutures removed, no additional wound care required  Hypertension, well controlled    Stable, continue current medications  Dementia without behavioral disturbance, unspecified dementia type   Stable, patient is resident of [facility]  Fall in home, subsequent encounter   Recommend limiting ativan use as this may contribute to fall risk  BMI 26.0-26.9,adult   Healthy weight for patient    Patient readiness: acceptance and barriers:none    During the course of the visit the patient was educated and counseled about the following:     Hypertension:   Medication: no change.    Goals: Hypertension: Reduce Blood Pressure    Did patient meet goals/outcomes: Yes    The following self management tools provided: declined    Patient Instructions (the written plan) was given to the patient/family.     Time spent with patient: 15 minutes

## 2017-10-19 ENCOUNTER — TELEPHONE (OUTPATIENT)
Dept: FAMILY MEDICINE | Facility: CLINIC | Age: 82
End: 2017-10-19

## 2017-11-02 DIAGNOSIS — R07.9 CHEST PAIN, UNSPECIFIED TYPE: Primary | ICD-10-CM

## 2017-12-10 RX ORDER — CARVEDILOL 3.12 MG/1
3.12 TABLET ORAL 2 TIMES DAILY WITH MEALS
Qty: 60 TABLET | Refills: 11 | COMMUNITY
Start: 2017-12-10

## 2018-01-02 ENCOUNTER — DOCUMENTATION ONLY (OUTPATIENT)
Dept: FAMILY MEDICINE | Facility: CLINIC | Age: 83
End: 2018-01-02

## 2018-01-02 NOTE — PROGRESS NOTES
Pre-Visit Chart Review  For Appointment Scheduled on 1-3-18    Health Maintenance Due   Topic Date Due    TETANUS VACCINE  02/18/1950    DEXA SCAN  02/18/1972    Zoster Vaccine  02/18/1992    Pneumococcal (65+) (2 of 2 - PCV13) 10/23/2014    Influenza Vaccine  08/01/2017

## 2018-01-06 ENCOUNTER — HOSPITAL ENCOUNTER (EMERGENCY)
Facility: HOSPITAL | Age: 83
Discharge: HOME | End: 2018-01-07
Attending: EMERGENCY MEDICINE
Payer: MEDICARE

## 2018-01-06 DIAGNOSIS — S00.31XA NASAL ABRASION, INITIAL ENCOUNTER: ICD-10-CM

## 2018-01-06 DIAGNOSIS — S00.83XA FOREHEAD CONTUSION, INITIAL ENCOUNTER: ICD-10-CM

## 2018-01-06 DIAGNOSIS — F03.90 DEMENTIA WITHOUT BEHAVIORAL DISTURBANCE, UNSPECIFIED DEMENTIA TYPE: ICD-10-CM
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
## 2018-01-06 PROCEDURE — 99284 EMERGENCY DEPT VISIT MOD MDM: CPT

## 2018-01-07 VITALS
TEMPERATURE: 99 F | HEIGHT: 60 IN | OXYGEN SATURATION: 94 % | HEART RATE: 67 BPM | WEIGHT: 135 LBS | BODY MASS INDEX: 26.5 KG/M2 | RESPIRATION RATE: 16 BRPM | DIASTOLIC BLOOD PRESSURE: 66 MMHG | SYSTOLIC BLOOD PRESSURE: 145 MMHG

## 2018-01-07 PROCEDURE — 90715 TDAP VACCINE 7 YRS/> IM: CPT | Performed by: EMERGENCY MEDICINE

## 2018-01-07 PROCEDURE — 90471 IMMUNIZATION ADMIN: CPT | Performed by: EMERGENCY MEDICINE

## 2018-01-07 PROCEDURE — 25000003 PHARM REV CODE 250: Performed by: EMERGENCY MEDICINE

## 2018-01-07 PROCEDURE — 63600175 PHARM REV CODE 636 W HCPCS: Performed by: EMERGENCY MEDICINE

## 2018-01-07 RX ORDER — ACETAMINOPHEN 500 MG
500 TABLET ORAL EVERY 6 HOURS PRN
Qty: 20 TABLET | Refills: 0 | Status: SHIPPED | OUTPATIENT
Start: 2018-01-07 | End: 2018-01-12

## 2018-01-07 RX ORDER — ACETAMINOPHEN 325 MG/1
650 TABLET ORAL
Status: COMPLETED | OUTPATIENT
Start: 2018-01-07 | End: 2018-01-07

## 2018-01-07 RX ADMIN — CLOSTRIDIUM TETANI TOXOID ANTIGEN (FORMALDEHYDE INACTIVATED), CORYNEBACTERIUM DIPHTHERIAE TOXOID ANTIGEN (FORMALDEHYDE INACTIVATED), BORDETELLA PERTUSSIS TOXOID ANTIGEN (GLUTARALDEHYDE INACTIVATED), BORDETELLA PERTUSSIS FILAMENTOUS HEMAGGLUTININ ANTIGEN (FORMALDEHYDE INACTIVATED), BORDETELLA PERTUSSIS PERTACTIN ANTIGEN, AND BORDETELLA PERTUSSIS FIMBRIAE 2/3 ANTIGEN 0.5 ML: 5; 2; 2.5; 5; 3; 5 INJECTION, SUSPENSION INTRAMUSCULAR at 12:01

## 2018-01-07 RX ADMIN — ACETAMINOPHEN 650 MG: 325 TABLET, FILM COATED ORAL at 12:01

## 2018-01-07 NOTE — ED NOTES
Spoke with  concerning transportation back to facility since pt is ambulatory-awaiting return call for approval of ambulance from .

## 2018-01-07 NOTE — ED PROVIDER NOTES
Encounter Date: 1/6/2018    SCRIBE #1 NOTE: Jillian HERRERA, waldo scribing for, and in the presence of,  .       History     Chief Complaint   Patient presents with    Fall     Pt of [facility].. Fell forward while pulling up pants in restroom        01/07/2018 12:43 AM     Chief complaint: Head Contusion.      Tiffany Rivers is a 85 y.o. female who presents to the ED via EMS with a head contusion. EMS states the fall was witnessed by the [facility] home personal who state she she fell forward after using restroom and attempting to pull up her pants. Patient is a poor historian due to dementia however reports that she is not in any pain currently. Patient has not vomited or had LOC after the event per EMS.      The history is provided by the patient, the nursing home and the EMS personnel. No  was used.     Review of patient's allergies indicates:  No Known Allergies  Past Medical History:   Diagnosis Date    Hypertension      Past Surgical History:   Procedure Laterality Date    CATARACT EXTRACTION W/  INTRAOCULAR LENS IMPLANT      COLONOSCOPY  1999    normal per patient.      HYSTERECTOMY       Family History   Problem Relation Age of Onset    Diabetes Mother     Heart disease Father     Cancer Neg Hx      Social History   Substance Use Topics    Smoking status: Never Smoker    Smokeless tobacco: Never Used    Alcohol use No     Review of Systems   Reason unable to perform ROS: dementia        Physical Exam     Initial Vitals [01/06/18 2352]   BP Pulse Resp Temp SpO2   (!) 155/72 77 16 98.6 °F (37 °C) 96 %      MAP       99.67         Physical Exam    Constitutional: She appears well-nourished.   HENT:   Head: Normocephalic.    Large forehead contusion and nasal bridge abrasion. Patient in C spine immobilization.     Eyes: Conjunctivae and EOM are normal. Pupils are equal, round, and reactive to light.   Neck: Normal range of motion. Neck supple. No thyroid mass  present.   Cardiovascular: Normal rate and regular rhythm.   Abdominal: Soft. Normal appearance and bowel sounds are normal. There is no tenderness.   Musculoskeletal: She exhibits no tenderness.   No tenderness to palpation to all extremities. No tenderness to rocking hips.        Neurological: She is alert. She has normal strength. No cranial nerve deficit or sensory deficit.   Normal neuro exam. Only oriented to self. Negative straight leg raise.    Skin: Skin is warm and dry. No rash noted. No erythema.   Psychiatric: Her speech is normal. Cognition and memory are normal.         ED Course   Procedures  Labs Reviewed - No data to display          Medical Decision Making:   History:   Old Medical Records: I decided to obtain old medical records.  Initial Assessment:   This patient was interviewed and examined emergently.  Vital signs are stable.  She has no focal neurologic deficits at this time but does have evidence of significant for head hematoma nasal bridge abrasion.  There is no evidence of additional bodily trauma, including extremity ecchymoses, swelling or gross bony abnormality.  CT scans of the head and maxillofacial region and cervical spine are negative for acute fracture, dislocation, intracranial bleeding.  She had a stable period of ED observation and she will be discharged with recommendations for supportive care.  She is asked to follow-up with her primary care doctor as soon as possible regarding improvement.  Additionally advised to return to the ER for any new, concerning, or worsening symptoms.  The patient received medical transport back to her nursing facility.  Clinical Tests:   Radiological Study: Ordered and Reviewed            Scribe Attestation:   Scribe #1: I performed the above scribed service and the documentation accurately describes the services I performed. I attest to the accuracy of the note.            ED Course      Clinical Impression:   The primary encounter diagnosis was  Fall on same level from stumbling, initial encounter. Diagnoses of Forehead contusion, initial encounter, Nasal abrasion, initial encounter, and Dementia without behavioral disturbance, unspecified dementia type were also pertinent to this visit.    Disposition:   Disposition: Discharged  Condition: Stable                        David Stoddard MD  01/08/18 0144

## 2018-01-07 NOTE — ED NOTES
Pt presents from [facility]-fell forward when standing up after using bathroom; unwitnessed; large hematoma noted to center of forehead and abrasions to nose. C collar from EMS in place.

## 2018-01-11 ENCOUNTER — TELEPHONE (OUTPATIENT)
Dept: FAMILY MEDICINE | Facility: CLINIC | Age: 83
End: 2018-01-11

## 2018-01-11 NOTE — TELEPHONE ENCOUNTER
Received notification by fax in my to be signed file from [facility] that patient was falling, more confused, strong urine smell, increased frequency.  Has been having possibly elevated temp 99.8 since 1/4/18 but no phone call or attempt to reach has been made other than fax. 3 fax messages sent but not addressed since not labeled urgent.  Called and spoke to Talia Carnes LPN at [facility] and told her patient needs to be seen immediately with these issues since possibility of UTI and possibly urosepsis is possible.  Needs to go to ED or urgent care.  Patient was seen in ED 1/6/18 for fall and laceration to face but MS changes nor U/A addressed.   I told her fax is entirely inappropriate for this kind of urgent message.  Needs to call and leave phone message with call center in future.  LPN said family has been out of town and unable to take her to medical clinic or ED.  Family has been  Told several times through various correspondence to make appt to discuss SNF placement as intensity of patient's needs are accelerating and not being able to be adequately met at [facility].  Patient has not been seen by me since 3/17.  PADMINI Kirby saw her for worsening behavior problems and agitation related to dementia 9/17.  Would like to order home health but family needs to bring patient in for face to face encounter before it can be ordered.  Cannot treat patient without being seen.  Recommended LPN call EMS for transport to ED for MS changes, fever and possible UTI now and let family know to make appt asap for f/u.  Agreed to do it

## 2018-01-26 DIAGNOSIS — F03.91 DEMENTIA WITH BEHAVIORAL DISTURBANCE, UNSPECIFIED DEMENTIA TYPE: ICD-10-CM

## 2018-01-26 DIAGNOSIS — R45.1 AGITATION: ICD-10-CM

## 2018-01-29 RX ORDER — LORAZEPAM 1 MG/1
1 TABLET ORAL DAILY PRN
Qty: 15 TABLET | Refills: 0 | Status: SHIPPED | OUTPATIENT
Start: 2018-01-29

## 2018-01-31 RX ORDER — AMLODIPINE BESYLATE 10 MG/1
10 TABLET ORAL DAILY
Qty: 90 TABLET | Refills: 3 | Status: SHIPPED | OUTPATIENT
Start: 2018-01-31 | End: 2018-11-13 | Stop reason: SDUPTHER

## 2018-09-24 ENCOUNTER — OUTSIDE PLACE OF SERVICE (OUTPATIENT)
Dept: ORTHOPEDICS | Facility: CLINIC | Age: 83
End: 2018-09-24
Payer: MEDICARE

## 2018-11-14 RX ORDER — AMLODIPINE BESYLATE 10 MG/1
10 TABLET ORAL DAILY
Qty: 90 TABLET | Refills: 3 | Status: SHIPPED | OUTPATIENT
Start: 2018-11-14 | End: 2019-11-14